# Patient Record
Sex: FEMALE | Race: WHITE | NOT HISPANIC OR LATINO
[De-identification: names, ages, dates, MRNs, and addresses within clinical notes are randomized per-mention and may not be internally consistent; named-entity substitution may affect disease eponyms.]

---

## 2018-03-30 ENCOUNTER — RESULT REVIEW (OUTPATIENT)
Age: 43
End: 2018-03-30

## 2019-04-11 ENCOUNTER — RESULT REVIEW (OUTPATIENT)
Age: 44
End: 2019-04-11

## 2020-07-20 ENCOUNTER — RESULT REVIEW (OUTPATIENT)
Age: 45
End: 2020-07-20

## 2020-07-21 PROBLEM — Z00.00 ENCOUNTER FOR PREVENTIVE HEALTH EXAMINATION: Status: ACTIVE | Noted: 2020-07-21

## 2020-07-22 ENCOUNTER — APPOINTMENT (OUTPATIENT)
Dept: BREAST CENTER | Facility: CLINIC | Age: 45
End: 2020-07-22
Payer: COMMERCIAL

## 2020-07-22 VITALS
HEIGHT: 64 IN | DIASTOLIC BLOOD PRESSURE: 74 MMHG | WEIGHT: 140 LBS | HEART RATE: 96 BPM | SYSTOLIC BLOOD PRESSURE: 101 MMHG | BODY MASS INDEX: 23.9 KG/M2

## 2020-07-22 DIAGNOSIS — Z78.9 OTHER SPECIFIED HEALTH STATUS: ICD-10-CM

## 2020-07-22 DIAGNOSIS — D68.0 VON WILLEBRAND'S DISEASE: ICD-10-CM

## 2020-07-22 DIAGNOSIS — Z86.39 PERSONAL HISTORY OF OTHER ENDOCRINE, NUTRITIONAL AND METABOLIC DISEASE: ICD-10-CM

## 2020-07-22 DIAGNOSIS — N64.4 MASTODYNIA: ICD-10-CM

## 2020-07-22 DIAGNOSIS — G61.81 CHRONIC INFLAMMATORY DEMYELINATING POLYNEURITIS: ICD-10-CM

## 2020-07-22 PROCEDURE — 99204 OFFICE O/P NEW MOD 45 MIN: CPT

## 2020-07-22 RX ORDER — GLUC/MSM/COLGN2/HYAL/ANTIARTH3 375-375-20
TABLET ORAL
Refills: 0 | Status: ACTIVE | COMMUNITY

## 2020-07-22 RX ORDER — IVABRADINE 7.5 MG/1
TABLET, FILM COATED ORAL
Refills: 0 | Status: ACTIVE | COMMUNITY

## 2020-07-22 RX ORDER — MULTIVITAMIN
TABLET ORAL
Refills: 0 | Status: ACTIVE | COMMUNITY

## 2020-07-22 NOTE — REVIEW OF SYSTEMS
[Heart Rate Is Fast] : fast heart rate [Palpitations] : palpitations [Abdominal Pain] : abdominal pain [Constipation] : constipation [Upper Back Pain] : upper back pain [Breast Pain] : breast pain [Limb Weakness] : limb weakness [Muscle Weakness] : muscle weakness [Easy Bruising] : a tendency for easy bruising [Negative] : Psychiatric

## 2020-07-23 NOTE — PHYSICAL EXAM
[Normocephalic] : normocephalic [No Supraclavicular Adenopathy] : no supraclavicular adenopathy [Atraumatic] : atraumatic [Supple] : supple [Symmetrical] : symmetrical [Examined in the supine and seated position] : examined in the supine and seated position [No Nipple Retraction] : no left nipple retraction [No dominant masses] : no dominant masses left breast [No Nipple Discharge] : no left nipple discharge [No Axillary Lymphadenopathy] : no left axillary lymphadenopathy [No Rashes] : no rashes [No Edema] : no edema [No Ulceration] : no ulceration [de-identified] : in area of patient's pain there is an area of nodularity

## 2020-07-23 NOTE — CONSULT LETTER
[Dear  ___] : Dear  [unfilled], [( Thank you for referring [unfilled] for consultation for _____ )] : Thank you for referring [unfilled] for consultation for [unfilled] [Sincerely,] : Sincerely, [Please see my note below.] : Please see my note below. [Consult Closing:] : Thank you very much for allowing me to participate in the care of this patient.  If you have any questions, please do not hesitate to contact me. [FreeTextEntry3] : Vicki Chong MS DO\par Breast Surgeon\par [facility] \[place] [address]\par  [DrTala  ___] : Dr. ROMANO

## 2020-07-23 NOTE — HISTORY OF PRESENT ILLNESS
[FreeTextEntry1] : This is a 44 years old being referred by Dr. Rodrigues for right breast pain. She had recent breast exam and was very tender on right. She noticed in her areola, 6 months ago greyish white fluid comes out with pressure only. Denies any trauma, changes to herbs, diet or supplements. She states it is not from her nipple but from the bumps around the areola.\par \par She does SBE. \par She has noticed a change in her right  breast or a right  breast lump.\par She has not noticed a change in her nipple or nipple area.\par She has not noticed a change in the skin of the breast.\par She is experiencing greyish white right discharge from sanford glands.\par She is experiencing right breast pain.\par She has noticed a lump or lymph node under the right armpit. \par \par BREAST CANCER RISK FACTORS\par Menarche: 14\par Date of LMP: 7/1/2020\par Menopause: pre \par Grav: 2     Para: 1\par Age at first live birth: 34\par Nursed: yes \par Hysterectomy: no\par Oophorectomy: no\par OCP: yes in the past for about 15 years. \par HRT: no\par Last pap/pelvic exam: 7/22/2020 results pending  \par Related family history: paternal grandmother BCA age 40\par Ashkenazi: yes\par Mastery risk assessment:  BRCAPRO 18.3%, TCv7 24.8%, TCv8 23.4%, Ann Marie 12%, Yusuf 9.1%\par BRCA testing: no\par Bra size: 34A\par \par Last mammogram: 6/12/2020               [facility] \par Report reviewed.                               Images reviewed on PACS\par Results: BIRADS 3\par stable examination with no findings.\par \par Last ultrasound: 6/12/2020                                  Location: [facility] \par Report reviewed.                                 Images reviewed. \par Results: BIRADS 3\par right breast: development of a probable focally dilated duct at 7:00. 6 month targeted ultrasound recommended.\par  Left breast: stable examination with no findings\par \par Last MRI:  never                                           Location:\par Report reviewed.\par

## 2020-07-23 NOTE — ASSESSMENT
[FreeTextEntry1] : 45 yo female with right breast pain\par reviewed her risk status, she is high risk on TCv7 and v8\par reviewed high risk screening\par recommend breast MRI, reviewed risk of false positives\par She will get MRI with her next cycle\par I will call her after breast MRI to formulate appropriate plan\par rec right breast 7:00 follow up sonogram DEC 2020\par if MRI negative plan cbe in 6-8 weeks to ensure stability of clinical breast exam\par She knows to call or return sooner should any concerns or questions arise.\par

## 2020-07-29 ENCOUNTER — TRANSCRIPTION ENCOUNTER (OUTPATIENT)
Age: 45
End: 2020-07-29

## 2020-09-08 ENCOUNTER — TRANSCRIPTION ENCOUNTER (OUTPATIENT)
Age: 45
End: 2020-09-08

## 2020-09-10 ENCOUNTER — TRANSCRIPTION ENCOUNTER (OUTPATIENT)
Age: 45
End: 2020-09-10

## 2020-09-21 ENCOUNTER — APPOINTMENT (OUTPATIENT)
Dept: HEMATOLOGY ONCOLOGY | Facility: CLINIC | Age: 45
End: 2020-09-21
Payer: COMMERCIAL

## 2020-09-21 ENCOUNTER — LABORATORY RESULT (OUTPATIENT)
Age: 45
End: 2020-09-21

## 2020-09-21 VITALS
OXYGEN SATURATION: 100 % | HEART RATE: 96 BPM | TEMPERATURE: 98.3 F | HEIGHT: 64 IN | SYSTOLIC BLOOD PRESSURE: 120 MMHG | WEIGHT: 141 LBS | BODY MASS INDEX: 24.07 KG/M2 | RESPIRATION RATE: 15 BRPM | DIASTOLIC BLOOD PRESSURE: 81 MMHG

## 2020-09-21 PROCEDURE — 99205 OFFICE O/P NEW HI 60 MIN: CPT

## 2020-09-21 NOTE — HISTORY OF PRESENT ILLNESS
[de-identified] : \par This is a 45 y/o woman with a hx of mild type 1 VW disease and now needs breast biopsy/ \par Patient noticed easy bruising when dx in lyme disease in 2012 , working in [place] , saw hematology there dr maradiaga. He ran VW panel and found the following : VW antigen was low at 51, RCofactor was 47(low) , VW activity 49(low) , multimers were normal and Factor 8 was low. \par She was also found to have type 0 blood so it was not clear if this was mild VW disease or possibly  from the blood type. \par In 2013, we have labs that show a very good response to ddAVP after about 30 mins with normalization of all levels. \par In the past she had had c sections, gallbladder and wisdom teeth. all ok no bleeding (did not receive any treatements) \par THen she had a sphintertromy 5 years ago and was given  ddAVP for preventing bleeding . She did not have any bleeding , Side effects included  face turns bright red , flusched hot feeling, no hx of sodium  dropping\par Had colonoscopy wit ddAVP  also. Tolerated well with no bleeding \par \par never nose bleeding but does bruise easier \par hx of heavy periods better now more regular \par She also has a history of hasinotos and follows with dr chung, \par Has a family hx of multiplecancers but did not do genetic testing yet \par She recently had a mammogram and ultrasound. THe ultrasound was read as focal ductal dilation at 7oclock.  recommend 6 month follow up but she had an MRI shich now showsareas of enhancement of the 6-7 oclock areas in right breast. \par She is scheduled to have biopsy via MRI on sept 30,2020. \par \par Patient also has a hx of leukopenia , this was thought to be due to elavil.  Never had bone marrow bx. 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

## 2020-09-21 NOTE — ASSESSMENT
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 \par \par 1) VW disease \par reviewed labs from 2012 and 2013, shows excelletn response to ddAVP\par she has tolerated ddAVP prior to 2 other procedures and tolerated well \par she has not had any serious bleeding episodes \par patient is going for breast bx \par although her levels are likley sufficient for biopsy it is safer to give ddAVP cheryl given that she has tolerated in past to prevent hematoma that can be painful and can interfere with surgery \par recommend ddAVP 0.3 mcg /kg prior to procedure (30 mins prior ) \par will come to infusion at 1030 am for a 1215 bx \par reviewed side effects including - flushing hyperthermia,  fluid retention and hyponatremia  and other serious reactions \par will check baseline coags today \par will also check na level today and one day after the ddAVP \par \par 2) breast abnormality on mri \par await bx \par discussed that even if bx is negative she is considered high risk given family hx and biopsy \par mri and mammogram alternatign \par consider risk reducing serm \par genetics pending \par \par 3) leukopenia \par get old labs from prior onc \par check cbc \par \par 4) mgus \par repeat spep ig levels etc \par \par \par follow up day after ddavp for cmp \par follow up in 2 weeks post surgery to discuss pathology and risk reduction  medications \par dw patient at length \par

## 2020-09-21 NOTE — PHYSICAL EXAM
[Restricted in physically strenuous activity but ambulatory and able to carry out work of a light or sedentary nature] : Status 1- Restricted in physically strenuous activity but ambulatory and able to carry out work of a light or sedentary nature, e.g., light house work, office work [Normal] : affect appropriate [de-identified] : prominent thyroid

## 2020-09-22 LAB
ALBUMIN MFR SERPL ELPH: 57 %
ALBUMIN SERPL ELPH-MCNC: 4.9 G/DL
ALBUMIN SERPL-MCNC: 4.7 G/DL
ALBUMIN/GLOB SERPL: 1.3 RATIO
ALP BLD-CCNC: 80 U/L
ALPHA1 GLOB MFR SERPL ELPH: 3.3 %
ALPHA1 GLOB SERPL ELPH-MCNC: 0.3 G/DL
ALPHA2 GLOB MFR SERPL ELPH: 8.6 %
ALPHA2 GLOB SERPL ELPH-MCNC: 0.7 G/DL
ALT SERPL-CCNC: 10 U/L
ANION GAP SERPL CALC-SCNC: 14 MMOL/L
APTT BLD: 33.6 SEC
APTT IMM NP/PRE NP PPP: NORMAL SEC
APTT INV RATIO PPP: 33.6 SEC
AST SERPL-CCNC: 17 U/L
B-GLOBULIN MFR SERPL ELPH: 9.2 %
B-GLOBULIN SERPL ELPH-MCNC: 0.8 G/DL
BASOPHILS # BLD AUTO: 0.03 K/UL
BASOPHILS NFR BLD AUTO: 0.6 %
BILIRUB SERPL-MCNC: 0.7 MG/DL
BUN SERPL-MCNC: 14 MG/DL
CALCIUM SERPL-MCNC: 9.8 MG/DL
CHLORIDE SERPL-SCNC: 100 MMOL/L
CO2 SERPL-SCNC: 25 MMOL/L
CREAT SERPL-MCNC: 0.7 MG/DL
DEPRECATED KAPPA LC FREE/LAMBDA SER: 1.55 RATIO
DEPRECATED KAPPA LC FREE/LAMBDA SER: 1.55 RATIO
EOSINOPHIL # BLD AUTO: 0.08 K/UL
EOSINOPHIL NFR BLD AUTO: 1.5 %
FACT IX ACT/NOR PPP: 103 %
FACT VIII ACT/NOR PPP: 115 %
FERRITIN SERPL-MCNC: 22 NG/ML
FOLATE SERPL-MCNC: 7.8 NG/ML
GAMMA GLOB FLD ELPH-MCNC: 1.8 G/DL
GAMMA GLOB MFR SERPL ELPH: 21.9 %
GLUCOSE SERPL-MCNC: 67 MG/DL
HCT VFR BLD CALC: 43.6 %
HGB BLD-MCNC: 13.8 G/DL
IGA SER QL IEP: 82 MG/DL
IGG SER QL IEP: 1697 MG/DL
IGM SER QL IEP: 267 MG/DL
IMM GRANULOCYTES NFR BLD AUTO: 0.2 %
INTERPRETATION SERPL IEP-IMP: NORMAL
IRON SATN MFR SERPL: 16 %
IRON SERPL-MCNC: 62 UG/DL
KAPPA LC CSF-MCNC: 0.89 MG/DL
KAPPA LC CSF-MCNC: 0.89 MG/DL
KAPPA LC SERPL-MCNC: 1.38 MG/DL
KAPPA LC SERPL-MCNC: 1.38 MG/DL
LDH SERPL-CCNC: 187 U/L
LYMPHOCYTES # BLD AUTO: 1.39 K/UL
LYMPHOCYTES NFR BLD AUTO: 26.7 %
M PROTEIN SPEC IFE-MCNC: NORMAL
MAN DIFF?: NORMAL
MCHC RBC-ENTMCNC: 29.1 PG
MCHC RBC-ENTMCNC: 31.7 GM/DL
MCV RBC AUTO: 92 FL
MONOCYTES # BLD AUTO: 0.33 K/UL
MONOCYTES NFR BLD AUTO: 6.3 %
NEUTROPHILS # BLD AUTO: 3.36 K/UL
NEUTROPHILS NFR BLD AUTO: 64.7 %
NPP NORMAL POOLED PLASMA: NORMAL SECS
PLATELET # BLD AUTO: 218 K/UL
POTASSIUM SERPL-SCNC: 3.9 MMOL/L
PROT SERPL-MCNC: 8.2 G/DL
RBC # BLD: 4.74 M/UL
RBC # FLD: 13.4 %
SODIUM SERPL-SCNC: 139 MMOL/L
TIBC SERPL-MCNC: 375 UG/DL
UIBC SERPL-MCNC: 313 UG/DL
VIT B12 SERPL-MCNC: 768 PG/ML
VWF AG PPP IA-ACNC: 124 %
VWF:RCO ACT/NOR PPP PL AGG: 108 %
WBC # FLD AUTO: 5.2 K/UL

## 2020-09-25 LAB — IGA 24H UR QL IFE: NORMAL

## 2020-10-01 ENCOUNTER — APPOINTMENT (OUTPATIENT)
Dept: HEMATOLOGY ONCOLOGY | Facility: CLINIC | Age: 45
End: 2020-10-01
Payer: COMMERCIAL

## 2020-10-01 VITALS
HEIGHT: 64 IN | BODY MASS INDEX: 24.92 KG/M2 | TEMPERATURE: 97.7 F | OXYGEN SATURATION: 98 % | DIASTOLIC BLOOD PRESSURE: 86 MMHG | HEART RATE: 93 BPM | WEIGHT: 146 LBS | SYSTOLIC BLOOD PRESSURE: 127 MMHG | RESPIRATION RATE: 18 BRPM

## 2020-10-01 PROCEDURE — 99214 OFFICE O/P EST MOD 30 MIN: CPT

## 2020-10-01 NOTE — HISTORY OF PRESENT ILLNESS
[de-identified] : his is a 45 y/o woman with a hx of mild type 1 VW disease and now needs breast biopsy/ \par Patient noticed easy bruising when dx in lyme disease in 2012 , working in [place] , saw hematology there dr maradiaga. He ran VW panel and found the following : VW antigen was low at 51, RCofactor was 47(low) , VW activity 49(low) , multimers were normal and Factor 8 was low. \par She was also found to have type 0 blood so it was not clear if this was mild VW disease or possibly from the blood type. \par In 2013, we have labs that show a very good response to ddAVP after about 30 mins with normalization of all levels. \par In the past she had had c sections, gallbladder and wisdom teeth. all ok no bleeding (did not receive any treatements) \par THen she had a sphintertromy 5 years ago and was given ddAVP for preventing bleeding. She did not have any bleeding , Side effects included face turns bright red , flusched hot feeling, no hx of sodium dropping\par Had colonoscopy wit ddAVP also. Tolerated well with no bleeding \par \par never nose bleeding but does bruise easier \par hx of heavy periods better now more regular \par She also has a history of hasinotos and follows with dr chung, \par Has a family hx of multiplecancers but did not do genetic testing yet \par She recently had a mammogram and ultrasound. THe ultrasound was read as focal ductal dilation at 7oclock. recommend 6 month follow up but she had an MRI shich now showsareas of enhancement of the 6-7 oclock areas in right breast. \par She is scheduled to have biopsy via MRI on sept 30,2020. \par \par Patient also has a hx of leukopenia , this was thought to be due to elavil. Never had bone marrow bx. 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 \par \par \par \par  [de-identified] : Feeling very poorly , got ddavp and bx yesterdy today is very nasueated having diarrhea and trouble urinating + headache \par no bleeding , bx went well \par

## 2020-10-01 NOTE — ASSESSMENT
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 \par \par \par 1) dehydration \par due to diarrhea and poor po intake since ddAVP \par proceed with hydration  - 1L NS \par call if symptoms fail to improve \par \par 2) hyponatremia \par due to ddAVP \par hydrate 1 L \par no evidence of seizures\par encourage to eat salt contianing foods \par should improve  today given dosing of ddAVP was over 24 hours ago \par repeat na level is stable \par will repeat  tomorrow \par \par 3) VW disease \par reviewed labs from 2012 and 2013, shows excelletn response to ddAVP\par she has tolerated ddAVP prior to 2 other procedures and tolerated well \par now with severe reaction and hyponatremia \par will hold before minor procedures in future \par no evidence of bleeding \par \par 4) breast abnormality on mri \par await bx \par discussed that even if bx is negative she is considered high risk given family hx and biopsy \par mri and mammogram alternatign \par consider risk reducing  SERM will dw patient at next visit \par genetics pending \par \par 3) leukopenia \par get old labs from prior onc \par check cbc \par \par 4) mgus \par follow up spep \par \par \par follow up tomorrow for na level \par follow up in 2 weeks to discuss pathology and chemoprevention \par dw patient at length

## 2020-10-01 NOTE — REVIEW OF SYSTEMS
[Fatigue] : fatigue [Diarrhea] : diarrhea [Negative] : Allergic/Immunologic [FreeTextEntry7] : n/v, diarreha

## 2020-10-01 NOTE — PHYSICAL EXAM
[Ambulatory and capable of all self care but unable to carry out any work activities] : Status 2- Ambulatory and capable of all self care but unable to carry out any work activities. Up and about more than 50% of waking hours [Normal] : affect appropriate [de-identified] : prominent thyroid , dry mm

## 2020-10-01 NOTE — REASON FOR VISIT
[Follow-Up Visit] : a follow-up visit for [FreeTextEntry2] : complains of fatigue diarrhea and nausea since ddavp yesterday

## 2020-10-02 ENCOUNTER — APPOINTMENT (OUTPATIENT)
Dept: HEMATOLOGY ONCOLOGY | Facility: CLINIC | Age: 45
End: 2020-10-02
Payer: COMMERCIAL

## 2020-10-02 VITALS
WEIGHT: 140 LBS | DIASTOLIC BLOOD PRESSURE: 77 MMHG | SYSTOLIC BLOOD PRESSURE: 112 MMHG | TEMPERATURE: 99.1 F | OXYGEN SATURATION: 99 % | RESPIRATION RATE: 19 BRPM | HEART RATE: 107 BPM | BODY MASS INDEX: 23.9 KG/M2 | HEIGHT: 64 IN

## 2020-10-02 PROCEDURE — 99499A: CUSTOM | Mod: NC

## 2020-10-06 LAB — VWF MULTIMERS PPP IA-ACNC: NORMAL

## 2020-10-12 ENCOUNTER — OUTPATIENT (OUTPATIENT)
Dept: OUTPATIENT SERVICES | Facility: HOSPITAL | Age: 45
LOS: 1 days | Discharge: HOME | End: 2020-10-12

## 2020-10-12 DIAGNOSIS — C44.40 UNSPECIFIED MALIGNANT NEOPLASM OF SKIN OF SCALP AND NECK: ICD-10-CM

## 2020-10-13 DIAGNOSIS — Z31.5 ENCOUNTER FOR PROCREATIVE GENETIC COUNSELING: ICD-10-CM

## 2020-10-14 ENCOUNTER — APPOINTMENT (OUTPATIENT)
Dept: HEMATOLOGY ONCOLOGY | Facility: CLINIC | Age: 45
End: 2020-10-14
Payer: COMMERCIAL

## 2020-10-14 PROCEDURE — 99499A: CUSTOM | Mod: NC

## 2020-10-16 ENCOUNTER — APPOINTMENT (OUTPATIENT)
Dept: HEMATOLOGY ONCOLOGY | Facility: CLINIC | Age: 45
End: 2020-10-16
Payer: COMMERCIAL

## 2020-10-16 VITALS
TEMPERATURE: 97.8 F | HEIGHT: 64 IN | BODY MASS INDEX: 24.45 KG/M2 | WEIGHT: 143.19 LBS | DIASTOLIC BLOOD PRESSURE: 82 MMHG | HEART RATE: 92 BPM | RESPIRATION RATE: 20 BRPM | SYSTOLIC BLOOD PRESSURE: 119 MMHG | OXYGEN SATURATION: 98 %

## 2020-10-16 PROCEDURE — 99214 OFFICE O/P EST MOD 30 MIN: CPT

## 2020-10-16 NOTE — HISTORY OF PRESENT ILLNESS
[de-identified] : had bad reaction to ddavp - diarrhea vomiting nausea confused  fatigue  and severe hyponatremia , improved with hydration \par no bleeding bx went well \par meeting with dr dewey next week \par pathology shows - right breast negative  focal ductal hyperplasia usual hyperplasia \par left breast - sclerosing intraductal papilloma \par \par \par  [de-identified] : his is a 45 y/o woman with a hx of mild type 1 VW disease and now needs breast biopsy/ \par Patient noticed easy bruising when dx in lyme disease in 2012 , working in [place] , saw hematology there dr maradiaga. He ran VW panel and found the following : VW antigen was low at 51, RCofactor was 47(low) , VW activity 49(low) , multimers were normal and Factor 8 was low. \par She was also found to have type 0 blood so it was not clear if this was mild VW disease or possibly from the blood type. \par In 2013, we have labs that show a very good response to ddAVP after about 30 mins with normalization of all levels. \par In the past she had had c sections, gallbladder and wisdom teeth. all ok no bleeding (did not receive any treatements) \par THen she had a sphintertromy 5 years ago and was given ddAVP for preventing bleeding. She did not have any bleeding , Side effects included face turns bright red , flusched hot feeling, no hx of sodium dropping\par Had colonoscopy wit ddAVP also. Tolerated well with no bleeding \par \par never nose bleeding but does bruise easier \par hx of heavy periods better now more regular \par She also has a history of hasinotos and follows with dr chung, \par Has a family hx of multiplecancers but did not do genetic testing yet \par She recently had a mammogram and ultrasound. THe ultrasound was read as focal ductal dilation at 7oclock. recommend 6 month follow up but she had an MRI shich now showsareas of enhancement of the 6-7 oclock areas in right breast. \par She is scheduled to have biopsy via MRI on sept 30,2020. \par \par Patient also has a hx of leukopenia , this was thought to be due to elavil. Never had bone marrow bx. 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 \par \par \par \par

## 2020-10-16 NOTE — ASSESSMENT
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 \par \par 1) VW disease \par reviewed labs from 2012 and 2013, shows excelletn response to ddAVP\par she has tolerated ddAVP prior to 2 other procedures and tolerated well \par now with severe reaction and hyponatremia  to ddavp given prior to breast bx \par now has to go for surgery for breast papilloma \par discussed the option of holding further ddavp and giving only if there is sig bleeding after procedure given excelelnt levels at last check \par patient is very nervous about bleeding so does not want to take chance \par repeat levels today (vw, activity etc) \par will then given lower dose ddavp and support with salt tabs (tolvaptan is also an option but very expensive and unlikely to be covered by insurance) , and other intervetions to prevent hyponatremia (see below) \par \par 2) hyponatremia \par due to ddAVP \par for next procedure will give lower dose ddAVP in preop area -  0.2mcg /kg , 30 mins prior \par salt tabs, and give zofran for nausea and imodium for diarrhea \par decrease amount of water (was drinking a lot after procedure) \par follow up day after \par \par 3) breast abnormality on mri \par biopsy results discussed with patient \par will be going for excision of papilloma dw patient at length \par mri and mammogram alternating \par consider risk reducing  SERM will dw patient at next visit \par genetics pending \par \par 4) leukopenia \par stable last cbc \par repeat now \par \par 5 ) mgus \par spep negative \par high ig levels due to IVIG \par \par \par follow up in 2 weeks post surgery \par dw patient at length

## 2020-10-16 NOTE — PHYSICAL EXAM
[Fully active, able to carry on all pre-disease performance without restriction] : Status 0 - Fully active, able to carry on all pre-disease performance without restriction [Normal] : pharynx is unremarkable, moist mucus membrane, no oral lesions [de-identified] : prom thyroid

## 2020-10-19 LAB
ALBUMIN SERPL ELPH-MCNC: 4.7 G/DL
ALP BLD-CCNC: 74 U/L
ALT SERPL-CCNC: 13 U/L
ANION GAP SERPL CALC-SCNC: 12 MMOL/L
AST SERPL-CCNC: 18 U/L
BASOPHILS # BLD AUTO: 0.04 K/UL
BASOPHILS NFR BLD AUTO: 0.9 %
BILIRUB SERPL-MCNC: 0.6 MG/DL
BUN SERPL-MCNC: 11 MG/DL
CALCIUM SERPL-MCNC: 9.3 MG/DL
CHLORIDE SERPL-SCNC: 103 MMOL/L
CO2 SERPL-SCNC: 24 MMOL/L
CREAT SERPL-MCNC: 0.61 MG/DL
EOSINOPHIL # BLD AUTO: 0.11 K/UL
EOSINOPHIL NFR BLD AUTO: 2.5 %
FACT VIII ACT/NOR PPP: 116 %
FERRITIN SERPL-MCNC: 29 NG/ML
FOLATE SERPL-MCNC: 17.8 NG/ML
GLUCOSE SERPL-MCNC: 59 MG/DL
HCT VFR BLD CALC: 42.1 %
HGB BLD-MCNC: 13.3 G/DL
IMM GRANULOCYTES NFR BLD AUTO: 0.9 %
LYMPHOCYTES # BLD AUTO: 1.1 K/UL
LYMPHOCYTES NFR BLD AUTO: 25.1 %
MAN DIFF?: NORMAL
MCHC RBC-ENTMCNC: 28.7 PG
MCHC RBC-ENTMCNC: 31.6 GM/DL
MCV RBC AUTO: 90.7 FL
MONOCYTES # BLD AUTO: 0.37 K/UL
MONOCYTES NFR BLD AUTO: 8.4 %
NEUTROPHILS # BLD AUTO: 2.72 K/UL
NEUTROPHILS NFR BLD AUTO: 62.2 %
PLATELET # BLD AUTO: 222 K/UL
POTASSIUM SERPL-SCNC: 4.5 MMOL/L
PROT SERPL-MCNC: 7.8 G/DL
RBC # BLD: 4.64 M/UL
RBC # FLD: 13.6 %
SODIUM SERPL-SCNC: 139 MMOL/L
TSH SERPL-ACNC: 3.5 UIU/ML
VIT B12 SERPL-MCNC: 985 PG/ML
VWF AG PPP IA-ACNC: 143 %
VWF:RCO ACT/NOR PPP PL AGG: 104 %
WBC # FLD AUTO: 4.38 K/UL

## 2020-10-21 ENCOUNTER — APPOINTMENT (OUTPATIENT)
Dept: BREAST CENTER | Facility: CLINIC | Age: 45
End: 2020-10-21
Payer: COMMERCIAL

## 2020-10-21 VITALS
DIASTOLIC BLOOD PRESSURE: 79 MMHG | HEART RATE: 102 BPM | HEIGHT: 64 IN | BODY MASS INDEX: 24.41 KG/M2 | WEIGHT: 143 LBS | SYSTOLIC BLOOD PRESSURE: 113 MMHG

## 2020-10-21 PROCEDURE — 99215 OFFICE O/P EST HI 40 MIN: CPT

## 2020-10-21 PROCEDURE — 99072 ADDL SUPL MATRL&STAF TM PHE: CPT

## 2020-10-21 NOTE — REASON FOR VISIT
[Follow-Up Visit] : a follow-up visit for [Follow-Up: _____] : a [unfilled] follow-up visit [FreeTextEntry1] : right breast pain

## 2020-10-21 NOTE — ASSESSMENT
[FreeTextEntry1] : 46 yo female with right breast pain-improved and left breast IDP\par I explained that it does not increase her risk of breast cancer but that surgical excision is recommended to rule out any associated underlying malignant processes. We reviewed the option of observation alone. We also reviewed the procedure of localization (magseed) and excision. We reviewed postop care and recovery. We reviewed the risks of infection, bleeding, hematoma, seroma, deformity, scarring and change of sensation particularly in the nipple with possible loss of sensation. I outlined the procedure and what she should expect on the day of surgery. She understands all of the above and her questions have been answered. She will see Dr. Claire  for medical clearance. Reviewed COVID preop testing.\par \par rec right breast 7:00 follow up sonogram DEC 2020\par \par She knows to call or return sooner should any concerns or questions arise.\par

## 2020-10-21 NOTE — PHYSICAL EXAM
[Normal] : affect appropriate [Normocephalic] : normocephalic [Atraumatic] : atraumatic [Supple] : supple [No Supraclavicular Adenopathy] : no supraclavicular adenopathy [Examined in the supine and seated position] : examined in the supine and seated position [Symmetrical] : symmetrical [No dominant masses] : no dominant masses left breast [No Nipple Retraction] : no left nipple retraction [No Nipple Discharge] : no left nipple discharge [No Axillary Lymphadenopathy] : no left axillary lymphadenopathy [No Edema] : no edema [No Rashes] : no rashes [No Ulceration] : no ulceration [de-identified] : healing bx site [de-identified] : healing bx site, no masses

## 2020-10-21 NOTE — HISTORY OF PRESENT ILLNESS
[FreeTextEntry1] : This is a 45 years old being referred by Dr. Rodrigues for right breast pain. She had recent breast exam and was very tender on right. She noticed in her areola, 6 months ago greyish white fluid comes out with pressure only. Denies any trauma, changes to herbs, diet or supplements. She states it is not from her nipple but from the bumps around the areola.\par Her sodium level dropped after DDavp is following with Dr. Brothers. Will have ddAVP prior to excision.\par She is s/p right 9/30/2020 (stoplight) MR bx path showed focal ductal hyperplasia and CCC and left breast 4:00 portion of IDP (stoplight) clip.\par \par She does SBE. \par She has noticed a change in her right  breast or a right  breast lump.\par She has not noticed a change in her nipple or nipple area.\par She has not noticed a change in the skin of the breast.\par She is experiencing greyish white right discharge from sanford glands.\par She is experiencing right breast pain.\par She has noticed a lump or lymph node under the right armpit. \par \par BREAST CANCER RISK FACTORS\par Menarche: 14\par Date of LMP: 10/16/2020\par Menopause: pre \par Grav: 2     Para: 1\par Age at first live birth: 34\par Nursed: yes \par Hysterectomy: no\par Oophorectomy: no\par OCP: yes in the past for about 15 years. \par HRT: no\par Last pap/pelvic exam: 7/22/2020 WNL\par Related family history: paternal grandmother BCA age 40\par Ashkenazi: yes\par Mastery risk assessment:  BRCAPRO 18.3%, TCv7 24.8%, TCv8 23.4%, Ann Marie 12%, Yusuf 9.1%\par BRCA testing: no\par Bra size: 34A\par \par Last mammogram: 6/12/2020               Location: [facility] \par Report reviewed.                               Images reviewed on PACS\par Results: BIRADS 3\par stable examination with no findings.\par \par Last ultrasound: 6/12/2020                                  Location: [facility] \par Report reviewed.                                 Images reviewed. \par Results: BIRADS 3\par right breast: development of a probable focally dilated duct at 7:00. 6 month targeted ultrasound recommended.\par  Left breast: stable examination with no findings\par \par Last MRI:  9/3/2020                                           Location: [facility]\par Report reviewed.\par Results: BIRADS 4\par right breast: Abnormal area of enhancement at 6:00-7:00 for which an MR guided biopsy is recommended. Additional focus of benign-appearing nodular enhancement at 3:00.\par left breast: focus of benign appearing nodular enhancement. MR biopsy rec.

## 2020-10-21 NOTE — CONSULT LETTER
[Dear  ___] : Dear  [unfilled], [( Thank you for referring [unfilled] for consultation for _____ )] : Thank you for referring [unfilled] for consultation for [unfilled] [Please see my note below.] : Please see my note below. [Consult Closing:] : Thank you very much for allowing me to participate in the care of this patient.  If you have any questions, please do not hesitate to contact me. [Sincerely,] : Sincerely, [DrTala  ___] : Dr. ROMANO [FreeTextEntry1] : She will be having left excisional breast biopsy for intraductal papilloma. [FreeTextEntry3] : Vicki Chong MS DO\par Breast Surgeon\par [facility] \[place] [address]\par

## 2020-10-22 ENCOUNTER — NON-APPOINTMENT (OUTPATIENT)
Age: 45
End: 2020-10-22

## 2020-10-23 LAB
CORTICOSTEROID BIND GLOBULIN: 2.6 MG/DL
CORTIS SERPL-MCNC: 9.5 UG/DL
CORTISOL, FREE: 0.38 UG/DL
PFCX: 4 %

## 2020-11-09 LAB — VWF MULTIMERS PPP IA-ACNC: NORMAL

## 2020-11-24 ENCOUNTER — NON-APPOINTMENT (OUTPATIENT)
Age: 45
End: 2020-11-24

## 2020-12-09 ENCOUNTER — NON-APPOINTMENT (OUTPATIENT)
Age: 45
End: 2020-12-09

## 2020-12-10 ENCOUNTER — APPOINTMENT (OUTPATIENT)
Dept: BREAST CENTER | Facility: HOSPITAL | Age: 45
End: 2020-12-10
Payer: COMMERCIAL

## 2020-12-10 PROCEDURE — 14001 TIS TRNFR TRUNK 10.1-30SQCM: CPT

## 2020-12-10 PROCEDURE — 19125 EXCISION BREAST LESION: CPT | Mod: LT,59

## 2020-12-10 PROCEDURE — 76098 X-RAY EXAM SURGICAL SPECIMEN: CPT | Mod: 26

## 2020-12-11 ENCOUNTER — APPOINTMENT (OUTPATIENT)
Dept: HEMATOLOGY ONCOLOGY | Facility: CLINIC | Age: 45
End: 2020-12-11
Payer: COMMERCIAL

## 2020-12-11 VITALS
TEMPERATURE: 97.9 F | RESPIRATION RATE: 18 BRPM | OXYGEN SATURATION: 99 % | HEART RATE: 90 BPM | BODY MASS INDEX: 24.59 KG/M2 | WEIGHT: 144 LBS | HEIGHT: 64 IN

## 2020-12-11 VITALS — SYSTOLIC BLOOD PRESSURE: 120 MMHG | DIASTOLIC BLOOD PRESSURE: 84 MMHG

## 2020-12-11 PROCEDURE — 99213 OFFICE O/P EST LOW 20 MIN: CPT

## 2020-12-11 PROCEDURE — 99072 ADDL SUPL MATRL&STAF TM PHE: CPT

## 2020-12-11 NOTE — ASSESSMENT
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 \par \par 1) VW disease \par reviewed labs from 2012 and 2013, shows excelletn response to ddAVP\par she has tolerated ddAVP prior to 2 other procedures and tolerated well \par now with severe reaction and hyponatremia  to ddavp given prior to breast bx \par now has to go for surgery for breast papilloma \par discussed the option of holding further ddavp and giving only if there is sig bleeding after procedure given excelelnt levels at last check \par patient is very nervous about bleeding so does not want to take chance \par repeat levels today (vw, activity etc) \par will then given lower dose ddavp and support with salt tabs (tolvaptan is also an option but very expensive and unlikely to be covered by insurance) , and other interventions to prevent hyponatremia (see below) \par \par 2) hyponatremia - mild & asymptomatic\par Na 12/11/2020: 132\par due to ddAVP \par pt will take additional 1g PO sodium today\par decrease amount of water \par has not needed Zofran or Imodium\par \par \par 3) breast abnormality on MRI \par biopsy results discussed with patient \par will be going for excision of papilloma dw patient at length \par MRI and mammogram alternating \par consider risk reducing  SERM will dw patient \par await pathology from yesterday's breast excision\par genetics pending \par \par 4) leukopenia \par stable last CBC \par repeat now \par \par 5 ) MGUS \par SPEP negative \par high Ig levels due to IVIG \par \par \par follow up in 2 weeks post surgery \par dw patient at length

## 2020-12-11 NOTE — HISTORY OF PRESENT ILLNESS
[de-identified] : 12/11/2020:\par Surgery went well yesterday\par tolerated ddAVP yesterday\par take 1g sodium PO last night\par no fatigue, no h/a, nausea or vomiting\par no bleeding, no bruising\par \par pathology from breast excision yesterday is pending\par previous pathology showed:\par pathology shows - right breast negative  focal ductal hyperplasia usual hyperplasia \par left breast - sclerosing intraductal papilloma \par \par \par  [de-identified] : his is a 43 y/o woman with a hx of mild type 1 VW disease and now needs breast biopsy/ \par Patient noticed easy bruising when dx in lyme disease in 2012 , working in [place] , saw hematology there dr maradiaga. He ran VW panel and found the following : VW antigen was low at 51, RCofactor was 47(low) , VW activity 49(low) , multimers were normal and Factor 8 was low. \par She was also found to have type 0 blood so it was not clear if this was mild VW disease or possibly from the blood type. \par In 2013, we have labs that show a very good response to ddAVP after about 30 mins with normalization of all levels. \par In the past she had had c sections, gallbladder and wisdom teeth. all ok no bleeding (did not receive any treatements) \par THen she had a sphintertromy 5 years ago and was given ddAVP for preventing bleeding. She did not have any bleeding , Side effects included face turns bright red , flusched hot feeling, no hx of sodium dropping\par Had colonoscopy wit ddAVP also. Tolerated well with no bleeding \par \par never nose bleeding but does bruise easier \par hx of heavy periods better now more regular \par She also has a history of hasinotos and follows with dr chung, \par Has a family hx of multiplecancers but did not do genetic testing yet \par She recently had a mammogram and ultrasound. THe ultrasound was read as focal ductal dilation at 7oclock. recommend 6 month follow up but she had an MRI shich now showsareas of enhancement of the 6-7 oclock areas in right breast. \par She is scheduled to have biopsy via MRI on sept 30,2020. \par \par Patient also has a hx of leukopenia , this was thought to be due to elavil. Never had bone marrow bx. 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 \par \par \par \par

## 2020-12-11 NOTE — REASON FOR VISIT
[Follow-Up Visit] : a follow-up visit for [FreeTextEntry2] : here for follow up of VW disease, check Na day after ddAVP

## 2020-12-11 NOTE — PHYSICAL EXAM
[Fully active, able to carry on all pre-disease performance without restriction] : Status 0 - Fully active, able to carry on all pre-disease performance without restriction [Normal] : affect appropriate [de-identified] : prom thyroid

## 2020-12-15 LAB
ALBUMIN SERPL ELPH-MCNC: 4.3 G/DL
ALP BLD-CCNC: 64 U/L
ALT SERPL-CCNC: 10 U/L
ANION GAP SERPL CALC-SCNC: 13 MMOL/L
AST SERPL-CCNC: 17 U/L
BILIRUB SERPL-MCNC: 0.8 MG/DL
BUN SERPL-MCNC: 12 MG/DL
CALCIUM SERPL-MCNC: 8.9 MG/DL
CHLORIDE SERPL-SCNC: 98 MMOL/L
CO2 SERPL-SCNC: 23 MMOL/L
CREAT SERPL-MCNC: 0.78 MG/DL
FERRITIN SERPL-MCNC: 33 NG/ML
GLUCOSE SERPL-MCNC: 62 MG/DL
IRON SATN MFR SERPL: 28 %
IRON SERPL-MCNC: 81 UG/DL
POTASSIUM SERPL-SCNC: 4.4 MMOL/L
PROT SERPL-MCNC: 7 G/DL
SODIUM SERPL-SCNC: 134 MMOL/L
TIBC SERPL-MCNC: 292 UG/DL
UIBC SERPL-MCNC: 211 UG/DL

## 2020-12-17 ENCOUNTER — TRANSCRIPTION ENCOUNTER (OUTPATIENT)
Age: 45
End: 2020-12-17

## 2020-12-23 ENCOUNTER — APPOINTMENT (OUTPATIENT)
Dept: BREAST CENTER | Facility: CLINIC | Age: 45
End: 2020-12-23

## 2021-01-13 ENCOUNTER — APPOINTMENT (OUTPATIENT)
Dept: BREAST CENTER | Facility: CLINIC | Age: 46
End: 2021-01-13
Payer: COMMERCIAL

## 2021-01-13 PROCEDURE — 99024 POSTOP FOLLOW-UP VISIT: CPT

## 2021-01-13 NOTE — ASSESSMENT
[FreeTextEntry1] : doing well\par path reviewed copy given\par rec med/onc cs\par plan mg/sono JUN 2021\par plan DEC 2021\par f/u after JUN imaging\par She knows to call or return sooner should any concerns or questions arise.\par

## 2021-01-13 NOTE — HISTORY OF PRESENT ILLNESS
[FreeTextEntry1] : This is a 45 years old being referred by Dr. Rodrigues for right breast pain. She had recent breast exam and was very tender on right. She noticed in her areola, 6 months ago greyish white fluid comes out with pressure only. Denies any trauma, changes to herbs, diet or supplements. She states it is not from her nipple but from the bumps around the areola.\par Her sodium level dropped after DDavp is following with Dr. Brothers. Will have ddAVP prior to excision.\par She is s/p right 9/30/2020 (stoplight) MR bx path showed focal ductal hyperplasia and CCC and left breast 4:00 portion of IDP (stoplight) clip.\par \par She is s/p left breast 4:00 Ebbx on 12/10/2020 path showed benign breast parenchyma showing UDH, fibroadenomatoid changes, cysts, ccc and stromal fibrosis. She became COVID positive 12/10/2020 and is now ok. Her father is recovering from sepsis and covid.\par \par She does SBE. \par She has noticed a change in her right  breast or a right  breast lump.\par She has not noticed a change in her nipple or nipple area.\par She has not noticed a change in the skin of the breast.\par She is experiencing greyish white right discharge from sanford glands.\par She is experiencing right breast pain.\par She has noticed a lump or lymph node under the right armpit. \par \par BREAST CANCER RISK FACTORS\par Menarche: 14\par Date of LMP: 10/16/2020\par Menopause: pre \par Grav: 2     Para: 1\par Age at first live birth: 34\par Nursed: yes \par Hysterectomy: no\par Oophorectomy: no\par OCP: yes in the past for about 15 years. \par HRT: no\par Last pap/pelvic exam: 7/22/2020 WNL\par Related family history: paternal grandmother BCA age 40\par Ashkenazi: yes\par Mastery risk assessment:  BRCAPRO 18.3%, TCv7 24.8%, TCv8 23.4%, Ann Marie 12%, Yusuf 9.1%\par BRCA testing: no\par Bra size: 34A\par \par Last mammogram: 6/12/2020               Location: [facility] \par Report reviewed.                               Images reviewed on PACS\par Results: BIRADS 3\par stable examination with no findings.\par \par Last ultrasound: 6/12/2020                                  Location: [facility] \par Report reviewed.                                 Images reviewed. \par Results: BIRADS 3\par right breast: development of a probable focally dilated duct at 7:00. 6 month targeted ultrasound recommended.\par  Left breast: stable examination with no findings\par \par Last MRI:  9/3/2020                                           Location: [facility]\par Report reviewed.\par Results: BIRADS 4\par right breast: Abnormal area of enhancement at 6:00-7:00 for which an MR guided biopsy is recommended. Additional focus of benign-appearing nodular enhancement at 3:00.\par left breast: focus of benign appearing nodular enhancement. MR biopsy rec.

## 2021-02-10 ENCOUNTER — APPOINTMENT (OUTPATIENT)
Dept: HEMATOLOGY ONCOLOGY | Facility: CLINIC | Age: 46
End: 2021-02-10
Payer: COMMERCIAL

## 2021-02-10 VITALS
BODY MASS INDEX: 24.72 KG/M2 | TEMPERATURE: 97.9 F | OXYGEN SATURATION: 99 % | WEIGHT: 144 LBS | RESPIRATION RATE: 18 BRPM | HEART RATE: 102 BPM | SYSTOLIC BLOOD PRESSURE: 125 MMHG | DIASTOLIC BLOOD PRESSURE: 77 MMHG

## 2021-02-10 PROCEDURE — 99214 OFFICE O/P EST MOD 30 MIN: CPT

## 2021-02-10 PROCEDURE — 99072 ADDL SUPL MATRL&STAF TM PHE: CPT

## 2021-02-10 NOTE — ASSESSMENT
[FreeTextEntry1] : This is a 45 y/o premenopausal woman who presents for a hx of mild type 1 VW disease (also type 0 blood) , leukopenia, and possible MGUS in setting of many autoimmune diagnosis including hasimotos and also CIDP on IVIG \par \par 1) VW disease \par reviewed labs from 2012 and 2013, shows excelletn response to ddAVP\par she has tolerated ddAVP prior to 2 other procedures and tolerated well \par now with severe reaction and hyponatremia  to ddavp given prior to breast bx \par Patient tolerated the breast surgery well with low-dose DDAVP\par Her von Willebrand levels were within normal limits even prior to the DDAVP now on 2 occasions\par This brings into question the diagnosis of mild type I von Willebrand disease.\par Patient will repeat levels of von Willebrand, activity level, von Willebrand multimers and factor VIII level\par Patient has been off IVIG for some time now it is not clear whether might not this may have some relationship to her levels\par Furthermore the patient does have type O blood.\par Advised the patient that if she requires any further surgical procedures or biopsies that she keep us informed so that we can continue to monitor her levels however given her severe reaction to the DDAVP with hyponatremia if her levels are normal we may hold off on giving DDAVP and just monitor post procedure for any bleeding\par \par 2) hyponatremia -\par Follow-up repeat levels, due to DDAVP\par \par \par \par 3) breast abnormality on MRI \par biopsy results discussed with patient \par Patient had no evidence of atypia or cancer on excisional biopsy\par Ann Marie score was calculated today and is 14%\par The benefits do not seem to outweigh the risks in terms of chemoprevention at this point but will continue to monitor\par \par 4) leukopenia \par Follow-up repeat CBC\par repeat now \par \par 5 ) polyclonal elevation in immunoglobulins\par high Ig levels due to IVIG \par No monoclonal spike on immunoelectrophoresis\par \par Discussed with patient at length \par follow-up in 1 year\par

## 2021-02-10 NOTE — HISTORY OF PRESENT ILLNESS
[de-identified] : \par INTERVAL HX \par 12/18/20- surgery left breast negative for malginancy \par \par 12//20   , had covid recovered well and now has antibodies detected in her peripheral blood for Covid\par \par Neuro wants to hold ivig before and after vaccine \par \par cbc done today with endocrine , [facility] , dr chung \par getting iv ig  today \par mammo and ultrasound - july 2021 , see dr dewey , in 6 months will do mri \par  [de-identified] : his is a 45 y/o woman with a hx of mild type 1 VW disease and now needs breast biopsy/ \par Patient noticed easy bruising when dx in lyme disease in 2012 , working in [place] , saw hematology there dr maradiaga. He ran VW panel and found the following : VW antigen was low at 51, RCofactor was 47(low) , VW activity 49(low) , multimers were normal and Factor 8 was low. \par She was also found to have type 0 blood so it was not clear if this was mild VW disease or possibly from the blood type. \par In 2013, we have labs that show a very good response to ddAVP after about 30 mins with normalization of all levels. \par In the past she had had c sections, gallbladder and wisdom teeth. all ok no bleeding (did not receive any treatements) \par THen she had a sphintertromy 5 years ago and was given ddAVP for preventing bleeding. She did not have any bleeding , Side effects included face turns bright red , flusched hot feeling, no hx of sodium dropping\par Had colonoscopy wit ddAVP also. Tolerated well with no bleeding \par \par never nose bleeding but does bruise easier \par hx of heavy periods better now more regular \par She also has a history of hasinotos and follows with dr chung, \par Has a family hx of multiplecancers but did not do genetic testing yet \par She recently had a mammogram and ultrasound. THe ultrasound was read as focal ductal dilation at 7oclock. recommend 6 month follow up but she had an MRI shich now showsareas of enhancement of the 6-7 oclock areas in right breast. \par She is scheduled to have biopsy via MRI on sept 30,2020. \par \par Patient also has a hx of leukopenia , this was thought to be due to elavil. Never had bone marrow bx. 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 \par \par \par \par

## 2021-02-10 NOTE — REVIEW OF SYSTEMS
[Negative] : Allergic/Immunologic [Fever] : no fever [Fatigue] : no fatigue [Recent Change In Weight] : ~T no recent weight change [Diarrhea] : no diarrhea

## 2021-07-12 ENCOUNTER — NON-APPOINTMENT (OUTPATIENT)
Age: 46
End: 2021-07-12

## 2021-07-12 ENCOUNTER — APPOINTMENT (OUTPATIENT)
Dept: BREAST CENTER | Facility: CLINIC | Age: 46
End: 2021-07-12
Payer: COMMERCIAL

## 2021-07-12 VITALS — DIASTOLIC BLOOD PRESSURE: 78 MMHG | HEART RATE: 103 BPM | HEIGHT: 64 IN | SYSTOLIC BLOOD PRESSURE: 111 MMHG

## 2021-07-12 DIAGNOSIS — Z80.8 FAMILY HISTORY OF MALIGNANT NEOPLASM OF OTHER ORGANS OR SYSTEMS: ICD-10-CM

## 2021-07-12 DIAGNOSIS — Z20.822 CONTACT WITH AND (SUSPECTED) EXPOSURE TO COVID-19: ICD-10-CM

## 2021-07-12 DIAGNOSIS — Z80.42 FAMILY HISTORY OF MALIGNANT NEOPLASM OF PROSTATE: ICD-10-CM

## 2021-07-12 PROCEDURE — 99072 ADDL SUPL MATRL&STAF TM PHE: CPT

## 2021-07-12 PROCEDURE — 99214 OFFICE O/P EST MOD 30 MIN: CPT

## 2021-07-12 NOTE — ASSESSMENT
[FreeTextEntry1] : 46 yo female with high risk\par re-run mastery at 50\par \par plan mg/sono JUL 2022\par plan DEC 2021\par f/u 6 months\par She knows to call or return sooner should any concerns or questions arise.\par

## 2021-07-12 NOTE — HISTORY OF PRESENT ILLNESS
[FreeTextEntry1] : This is a 45 years old being referred by Dr. Rodrigues for right breast pain. She had recent breast exam and was very tender on right. She noticed in her areola, 6 months ago greyish white fluid comes out with pressure only. Denies any trauma, changes to herbs, diet or supplements. She states it is not from her nipple but from the bumps around the areola.\par Her sodium level dropped after DDavp is following with Dr. Brothers. Will have ddAVP prior to excision.\par She is s/p right 9/30/2020 (stoplight) MR bx path showed focal ductal hyperplasia and CCC and left breast 4:00 portion of IDP (stoplight) clip.\par \par She is s/p left breast 4:00 Ebbx on 12/10/2020 path showed benign breast parenchyma showing UDH, fibroadenomatoid changes, cysts, ccc and stromal fibrosis. She became COVID positive 12/10/2020 and is now ok. Her father is recovering from sepsis and covid.\par \par She completed Moderna right arm April 2021.\par \par She does SBE. \par She has noticed a change in her right  breast or a right  breast lump.\par She has not noticed a change in her nipple or nipple area.\par She has not noticed a change in the skin of the breast.\par She is experiencing greyish white right discharge from Bowen glands.\par She is experiencing right breast pain.\par She has noticed a lump or lymph node under the right armpit. \par \par BREAST CANCER RISK FACTORS\par Menarche: 14\par Date of LMP: 7/2/2021\par Menopause: pre \par Grav: 2     Para: 1 (11 yo son)\par Age at first live birth: 34\par Nursed: yes \par Hysterectomy: no\par Oophorectomy: no\par OCP: yes in the past for about 15 years. \par HRT: no\par Last pap/pelvic exam: 7/22/2020 WNL\par Related family history: paternal grandmother BCA age 40\par Ashkenazi: yes\par Mastery risk assessment:  BRCAPRO 18.3%, TCv7 24.8%, TCv8 23.4%, Ann Marie 12%, Yusuf 9.1%\par BRCA testing: no\par Bra size: 34A\par \par Last mammogram: 7/2/2021               Location[facility] \par Report reviewed.                               Images reviewed on PACS\par Results: BIRADS 1\par stable examination with no findings.\par \par Last ultrasound: 7/2/2021                                 Location: [facility] \par Report reviewed.                                 Images reviewed. \par Results: BIRADS 1\par stable examination with no evidence of malignancy\par \par Last MRI:  9/3/2020                                           Location: [facility]\par Report reviewed.\par Results: BIRADS 4\par right breast: Abnormal area of enhancement at 6:00-7:00 for which an MR guided biopsy is recommended. Additional focus of benign-appearing nodular enhancement at 3:00.\par left breast: focus of benign appearing nodular enhancement. MR biopsy rec. \par

## 2021-07-12 NOTE — PHYSICAL EXAM
[Normocephalic] : normocephalic [Atraumatic] : atraumatic [Supple] : supple [No Supraclavicular Adenopathy] : no supraclavicular adenopathy [Examined in the supine and seated position] : examined in the supine and seated position [No dominant masses] : no dominant masses in right breast  [No dominant masses] : no dominant masses left breast [No Nipple Retraction] : no left nipple retraction [No Nipple Discharge] : no left nipple discharge [No Axillary Lymphadenopathy] : no left axillary lymphadenopathy [No Edema] : no edema [No Rashes] : no rashes [No Ulceration] : no ulceration [de-identified] : healed periareolar incision, excellent cosmesis

## 2021-07-22 ENCOUNTER — RESULT REVIEW (OUTPATIENT)
Age: 46
End: 2021-07-22

## 2021-12-17 ENCOUNTER — NON-APPOINTMENT (OUTPATIENT)
Age: 46
End: 2021-12-17

## 2022-01-24 ENCOUNTER — APPOINTMENT (OUTPATIENT)
Dept: BREAST CENTER | Facility: CLINIC | Age: 47
End: 2022-01-24
Payer: COMMERCIAL

## 2022-01-24 VITALS
SYSTOLIC BLOOD PRESSURE: 109 MMHG | HEART RATE: 81 BPM | BODY MASS INDEX: 25.61 KG/M2 | DIASTOLIC BLOOD PRESSURE: 70 MMHG | WEIGHT: 150 LBS | HEIGHT: 64 IN

## 2022-01-24 DIAGNOSIS — N63.10 UNSPECIFIED LUMP IN THE RIGHT BREAST, UNSPECIFIED QUADRANT: ICD-10-CM

## 2022-01-24 PROCEDURE — 99214 OFFICE O/P EST MOD 30 MIN: CPT

## 2022-01-24 RX ORDER — AMITRIPTYLINE HYDROCHLORIDE 25 MG/1
TABLET ORAL
Refills: 0 | Status: DISCONTINUED | COMMUNITY
End: 2022-01-24

## 2022-01-24 NOTE — HISTORY OF PRESENT ILLNESS
[FreeTextEntry1] : This is a 46 years old being referred by Dr. Rodrigues for right breast pain. She had recent breast exam and was very tender on right. She noticed in her areola, 6 months ago greyish white fluid comes out with pressure only. Denies any trauma, changes to herbs, diet or supplements. She states it is not from her nipple but from the bumps around the areola.\par Her sodium level dropped after DDavp is following with Dr. Brothers. Will have ddAVP prior to excision.\par She is s/p right 9/30/2020 (stoplight) MR bx path showed focal ductal hyperplasia and CCC and left breast 4:00 portion of IDP (stoplight) clip.\par \par She is s/p left breast 4:00 Ebbx on 12/10/2020 path showed benign breast parenchyma showing UDH, fibroadenomatoid changes, cysts, ccc and stromal fibrosis. She became COVID positive 12/10/2020 and is now ok. Her father is recovering from sepsis and covid.\par \par She completed Moderna right arm April 2021.\par \par She does SBE. \par She has noticed a change in her right  breast or a right  breast lump.\par She has not noticed a change in her nipple or nipple area.\par She has not noticed a change in the skin of the breast.\par She is experiencing greyish white right discharge from Bowen glands.\par She is not experiencing right breast pain.\par She has noticed a lump or lymph node under the right armpit. \par \par BREAST CANCER RISK FACTORS\par Menarche: 14\par Date of LMP: 1/22/2021\par Menopause: pre \par Grav: 2     Para: 1 (13 yo son)\par Age at first live birth: 34\par Nursed: yes \par Hysterectomy: no\par Oophorectomy: no\par OCP: yes in the past for about 15 years. \par HRT: no\par Last pap/pelvic exam: 7/2021 WNL\par Related family history: paternal grandmother BCA age 40\par Ashkenazi: yes\par Mastery risk assessment:  BRCAPRO 18.3%, TCv7 24.8%, TCv8 23.4%, Ann Marie 12%, Yusuf 9.1%\par BRCA testing: no\par Bra size: 34A\par \par Last mammogram: 7/2/2021               Location[facility] \par Report reviewed.                               Images reviewed on PACS\par Results: BIRADS 1\par stable examination with no findings.\par \par Last ultrasound: 7/2/2021                                 Location: [facility] \par Report reviewed.                                 Images reviewed. \par Results: BIRADS 1\par stable examination with no evidence of malignancy\par \par Last MRI:  12/10/2021                                          Location: [facility]\par Report reviewed.\par Results: BIRADS 2\par Stable examination, no suspicious findings. \par

## 2022-01-24 NOTE — PHYSICAL EXAM
[Normocephalic] : normocephalic [Atraumatic] : atraumatic [Supple] : supple [No Supraclavicular Adenopathy] : no supraclavicular adenopathy [Examined in the supine and seated position] : examined in the supine and seated position [No dominant masses] : no dominant masses in right breast  [No dominant masses] : no dominant masses left breast [No Nipple Retraction] : no left nipple retraction [No Nipple Discharge] : no left nipple discharge [No Axillary Lymphadenopathy] : no left axillary lymphadenopathy [No Edema] : no edema [No Rashes] : no rashes [No Ulceration] : no ulceration [de-identified] : healed periareolar incision, excellent cosmesis

## 2022-01-24 NOTE — ASSESSMENT
[FreeTextEntry1] : 45 yo female with high risk\par re-run mastery at 50\par plan mg/sono JUL 2022\par plan MRI DEC 2022\par We reviewed risk reduction strategies including maintaining a BMI <25, limiting red meat intake and alcoholic beverages to 3 per week and exercise (150 min/ week low intensity or 75 min/week high intensity). And maintaining a normal vitamin D level.\par \par f/u 6 months with Dr. Rodrigues and me in 1 year\par She knows to call or return sooner should any concerns or questions arise.\par 
Clear

## 2022-03-02 ENCOUNTER — NON-APPOINTMENT (OUTPATIENT)
Age: 47
End: 2022-03-02

## 2022-03-24 ENCOUNTER — APPOINTMENT (OUTPATIENT)
Dept: HEMATOLOGY ONCOLOGY | Facility: CLINIC | Age: 47
End: 2022-03-24
Payer: COMMERCIAL

## 2022-03-24 VITALS
HEART RATE: 100 BPM | DIASTOLIC BLOOD PRESSURE: 82 MMHG | WEIGHT: 150 LBS | TEMPERATURE: 98.3 F | SYSTOLIC BLOOD PRESSURE: 121 MMHG | HEIGHT: 64 IN | OXYGEN SATURATION: 98 % | RESPIRATION RATE: 18 BRPM | BODY MASS INDEX: 25.61 KG/M2

## 2022-03-24 DIAGNOSIS — R19.7 DIARRHEA, UNSPECIFIED: ICD-10-CM

## 2022-03-24 DIAGNOSIS — R92.8 OTHER ABNORMAL AND INCONCLUSIVE FINDINGS ON DIAGNOSTIC IMAGING OF BREAST: ICD-10-CM

## 2022-03-24 DIAGNOSIS — D70.2 OTHER DRUG-INDUCED AGRANULOCYTOSIS: ICD-10-CM

## 2022-03-24 PROCEDURE — 99213 OFFICE O/P EST LOW 20 MIN: CPT

## 2022-03-29 PROBLEM — D70.2 OTHER DRUG-INDUCED NEUTROPENIA: Status: ACTIVE | Noted: 2020-09-21

## 2022-03-29 PROBLEM — R92.8 ABNORMAL FINDING ON BREAST IMAGING: Status: ACTIVE | Noted: 2020-07-22

## 2022-03-29 PROBLEM — R19.7 DIARRHEA: Status: ACTIVE | Noted: 2020-10-01

## 2022-03-29 NOTE — PHYSICAL EXAM
[Fully active, able to carry on all pre-disease performance without restriction] : Status 0 - Fully active, able to carry on all pre-disease performance without restriction [Normal] : bilateral breasts without nipple retraction, skin dimpling or palpable masses; the bilateral axillae are without adenopathy [de-identified] : well healed surgical incision to Left breast

## 2022-03-29 NOTE — ASSESSMENT
[FreeTextEntry1] : This is a 47 y/o premenopausal woman who presents for a hx of mild type 1 VW disease (also type 0 blood) , leukopenia, and possible MGUS in setting of many autoimmune diagnosis including hasimotos and also CIDP on IVIG \par \par 1) VW disease \par reviewed labs from 2012 and 2013, shows excelletn response to ddAVP\par she has tolerated ddAVP prior to 2 other procedures and tolerated well \par now with severe reaction and hyponatremia  to ddavp given prior to breast bx \par Patient tolerated the breast surgery well with low-dose DDAVP\par Her von Willebrand levels were within normal limits even prior to the DDAVP now on 2 occasions\par This brings into question the diagnosis of mild type I von Willebrand disease.\par Patient will repeat levels of von Willebrand, activity level, von Willebrand multimers and factor VIII level\par Patient has been off IVIG for some time now it is not clear whether might not this may have some relationship to her levels\par Furthermore the patient does have type O blood.\par re-Advised the patient that if she requires any further surgical procedures or biopsies that she keep us informed so that we can continue to monitor her levels however given her severe reaction to the DDAVP with hyponatremia if her levels are normal we may hold off on giving DDAVP and just monitor post procedure for any bleeding\par currently no procedures scheduled. \par \par 2) hyponatremia -\par Follow-up repeat levels, due to DDAVP\par repeat CMP today\par \par 3) breast abnormality on MRI \par biopsy results discussed with patient \par Patient had no evidence of atypia or cancer on excisional biopsy\par Ann Marie score was calculated today and is 14%\par The benefits do not seem to outweigh the risks in terms of chemoprevention at this point but will continue to monitor\par s/p recent Follow up with Dr. Restrepo \par next mammo/sono 07/2022; MRI breast due 12/2022\par \par 4) leukopenia \par Follow-up repeat CBC\par repeat CBC\par \par 5 ) polyclonal elevation in immunoglobulins\par high Ig levels due to IVIG \par No monoclonal spike on immunoelectrophoresis\par \par 6) iron deficiency \par likely r/t to menorrhagia. \par repeat ferritin today \par \par Discussed with patient at length \par follow-up in 1 year ( script for labs given to patient- she will complete prior to her appt). \par

## 2022-03-29 NOTE — HISTORY OF PRESENT ILLNESS
[de-identified] : \par feeling okay \par saw GI. had gastroparesis study. \par cannot drink lactose anymore \par taking oral iron every other day- still having heavy periods.  labs were checked right at the end of period. she is thinking this is why the ferritin was low. \par taking amytriptylline 10mg for neuropathy pain  and GI pain from gallbladder surgery. \par  [de-identified] : \par This is a 45 y/o woman with a hx of mild type 1 VW disease and now needs breast biopsy/ \par Patient noticed easy bruising when dx in lyme disease in 2012 , working in [place] , saw hematology there dr maradiaga. He ran VW panel and found the following : VW antigen was low at 51, RCofactor was 47(low) , VW activity 49(low) , multimers were normal and Factor 8 was low. \par She was also found to have type 0 blood so it was not clear if this was mild VW disease or possibly  from the blood type. \par In 2013, we have labs that show a very good response to ddAVP after about 30 mins with normalization of all levels. \par In the past she had had c sections, gallbladder and wisdom teeth. all ok no bleeding (did not receive any treatements) \par THen she had a sphintertromy 5 years ago and was given  ddAVP for preventing bleeding . She did not have any bleeding , Side effects included  face turns bright red , flusched hot feeling, no hx of sodium  dropping\par Had colonoscopy wit ddAVP  also. Tolerated well with no bleeding \par \par never nose bleeding but does bruise easier \par hx of heavy periods better now more regular \par She also has a history of hasinotos and follows with dr chung, \par Has a family hx of multiplecancers but did not do genetic testing yet \par She recently had a mammogram and ultrasound. THe ultrasound was read as focal ductal dilation at 7oclock.  recommend 6 month follow up but she had an MRI shich now showsareas of enhancement of the 6-7 oclock areas in right breast. \par She is scheduled to have biopsy via MRI on sept 30,2020. \par \par Patient also has a hx of leukopenia , this was thought to be due to elavil.  Never had bone marrow bx. 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

## 2022-07-25 ENCOUNTER — RESULT REVIEW (OUTPATIENT)
Age: 47
End: 2022-07-25

## 2022-07-28 ENCOUNTER — NON-APPOINTMENT (OUTPATIENT)
Age: 47
End: 2022-07-28

## 2022-11-29 ENCOUNTER — NON-APPOINTMENT (OUTPATIENT)
Age: 47
End: 2022-11-29

## 2022-12-27 ENCOUNTER — RESULT REVIEW (OUTPATIENT)
Age: 47
End: 2022-12-27

## 2022-12-27 ENCOUNTER — APPOINTMENT (OUTPATIENT)
Dept: HEMATOLOGY ONCOLOGY | Facility: CLINIC | Age: 47
End: 2022-12-27

## 2022-12-27 VITALS
WEIGHT: 150 LBS | SYSTOLIC BLOOD PRESSURE: 133 MMHG | HEART RATE: 98 BPM | HEIGHT: 64 IN | OXYGEN SATURATION: 94 % | TEMPERATURE: 98.4 F | BODY MASS INDEX: 25.61 KG/M2 | RESPIRATION RATE: 18 BRPM | DIASTOLIC BLOOD PRESSURE: 79 MMHG

## 2023-01-11 ENCOUNTER — NON-APPOINTMENT (OUTPATIENT)
Age: 48
End: 2023-01-11

## 2023-01-30 ENCOUNTER — APPOINTMENT (OUTPATIENT)
Dept: BREAST CENTER | Facility: CLINIC | Age: 48
End: 2023-01-30
Payer: COMMERCIAL

## 2023-01-30 VITALS
WEIGHT: 150 LBS | HEART RATE: 91 BPM | BODY MASS INDEX: 25.61 KG/M2 | DIASTOLIC BLOOD PRESSURE: 73 MMHG | SYSTOLIC BLOOD PRESSURE: 118 MMHG | HEIGHT: 64 IN

## 2023-01-30 DIAGNOSIS — D24.2 BENIGN NEOPLASM OF LEFT BREAST: ICD-10-CM

## 2023-01-30 DIAGNOSIS — Z86.2 PERSONAL HISTORY OF DISEASES OF THE BLOOD AND BLOOD-FORMING ORGANS AND CERTAIN DISORDERS INVOLVING THE IMMUNE MECHANISM: ICD-10-CM

## 2023-01-30 DIAGNOSIS — Z80.3 FAMILY HISTORY OF MALIGNANT NEOPLASM OF BREAST: ICD-10-CM

## 2023-01-30 PROCEDURE — 99215 OFFICE O/P EST HI 40 MIN: CPT

## 2023-01-30 RX ORDER — NORMAL SALT TABLETS 1 G/G
1 TABLET ORAL
Qty: 2 | Refills: 0 | Status: DISCONTINUED | COMMUNITY
Start: 2020-12-04 | End: 2023-01-30

## 2023-01-30 RX ORDER — AMITRIPTYLINE HYDROCHLORIDE 75 MG/1
TABLET, FILM COATED ORAL
Refills: 0 | Status: DISCONTINUED | COMMUNITY
End: 2023-01-30

## 2023-01-30 NOTE — CONSULT LETTER
[Dear  ___] : Dear  [unfilled], [Courtesy Letter:] : I had the pleasure of seeing your patient, [unfilled], in my office today. [Please see my note below.] : Please see my note below. [Consult Closing:] : Thank you very much for allowing me to participate in the care of this patient.  If you have any questions, please do not hesitate to contact me. [Sincerely,] : Sincerely, [DrTala  ___] : Dr. ROMANO [FreeTextEntry3] : Vicki Chong MS DO\par Breast Surgeon\par [facility] \[place] [address]\par

## 2023-01-30 NOTE — ASSESSMENT
[FreeTextEntry1] : 48 yo female with high risk\par re-run mastery at 50\par plan mg/sono JUL 2023\par plan MRI Jan 2024\par discussed management of IDP and since no atypia seen rec observation, will have pathology reviewed at [facility] as well\par \par We reviewed risk reduction strategies including maintaining a BMI <25, limiting red meat intake and alcoholic beverages to 3 per week and exercise (150 min/ week low intensity or 75 min/week high intensity). And maintaining a normal vitamin D level.\par \par f/u 6 months with Dr. Rodrigues and me in 1 year\par She knows to call or return sooner should any concerns or questions arise.\par

## 2023-01-30 NOTE — HISTORY OF PRESENT ILLNESS
[FreeTextEntry1] : This is a 47 years old being referred by Dr. Rodrigues for right breast pain. She had recent breast exam and was very tender on right. She noticed in her areola, 6 months ago greyish white fluid comes out with pressure only. Denies any trauma, changes to herbs, diet or supplements. She states it is not from her nipple but from the bumps around the areola.\par Her sodium level dropped after DDavp is following with Dr. Brothers. Will have ddAVP prior to excision.\par She is s/p right 9/30/2020 (stoplight) MR bx path showed focal ductal hyperplasia and CCC and left breast 4:00 portion of IDP (stoplight) clip.\par \par She is s/p left breast 4:00 Ebbx on 12/10/2020 path showed benign breast parenchyma showing UDH, fibroadenomatoid changes, cysts, ccc and stromal fibrosis. She became COVID positive 12/10/2020 and is now ok. Her father is recovering from sepsis and covid.\par \par She completed Moderna right arm April 2021.\par \par She is s/p left MRI bx 3:00 on 12/30/2022, pathology showed Nodular sclerosing lesion. 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 Dr. Perla felt it was completely removed.\par \par She does SBE. \par She has noticed a change in her right  breast or a right  breast lump.\par She has not noticed a change in her nipple or nipple area.\par She has not noticed a change in the skin of the breast.\par She is experiencing greyish white right discharge from Bowen glands.\par She is not experiencing right breast pain.\par She has noticed a lump or lymph node under the right armpit. \par \par BREAST CANCER RISK FACTORS\par Menarche: 14\par Date of LMP: 1/2023\par Menopause: pre \par Grav: 2     Para: 1 (11 yo son)\par Age at first live birth: 34\par Nursed: yes \par Hysterectomy: no\par Oophorectomy: no\par OCP: yes in the past for about 15 years. \par HRT: no\par Last pap/pelvic exam: 7/2022 WNL\par Related family history: paternal grandmother BCA age 40\par Ashkenazi: yes\par Mastery risk assessment:  BRCAPRO 18.3%, TCv7 24.8%, TCv8 23.4%, Ann Marie 12%, Yusuf 9.1%\par BRCA testing: no\par Bra size: 34A\par \par Last mammogram: 7/20/2022               Location: [facility] \par Report reviewed.                               Images reviewed on PACS\par Results: BIRADS 1\par Dense breasts, stable examination with no findings.\par \par Last ultrasound: 7/20/2022                                 Location: [facility] \par Report reviewed.                                 Images reviewed. \par Results: BIRADS 1\par stable examination with no evidence of malignancy\par \par Last MRI:  12/21/2022                                          Location: [facility]\par Report reviewed.\par Results: Right Breast BIRADS 2 Left Breast: BI-RADS 4\par 0.5 x 0.6cm irregular focus of enhancement in the left breast has slightly increases in size and in the amount of postcontrast enhancement since the prior exam. MRGbx is advised.

## 2023-01-30 NOTE — PHYSICAL EXAM
[Normocephalic] : normocephalic [Atraumatic] : atraumatic [Supple] : supple [No Supraclavicular Adenopathy] : no supraclavicular adenopathy [Examined in the supine and seated position] : examined in the supine and seated position [No dominant masses] : no dominant masses in right breast  [No dominant masses] : no dominant masses left breast [No Nipple Retraction] : no left nipple retraction [No Nipple Discharge] : no left nipple discharge [No Axillary Lymphadenopathy] : no left axillary lymphadenopathy [No Edema] : no edema [No Rashes] : no rashes [No Ulceration] : no ulceration [Symmetrical] : symmetrical [de-identified] : healed periareolar incision, excellent cosmesis

## 2023-02-05 ENCOUNTER — RESULT REVIEW (OUTPATIENT)
Age: 48
End: 2023-02-05

## 2023-03-07 ENCOUNTER — NON-APPOINTMENT (OUTPATIENT)
Age: 48
End: 2023-03-07

## 2023-03-22 DIAGNOSIS — E03.9 HYPOTHYROIDISM, UNSPECIFIED: ICD-10-CM

## 2023-03-22 DIAGNOSIS — E87.1 HYPO-OSMOLALITY AND HYPONATREMIA: ICD-10-CM

## 2023-03-22 DIAGNOSIS — R11.2 NAUSEA WITH VOMITING, UNSPECIFIED: ICD-10-CM

## 2023-03-22 DIAGNOSIS — E86.0 DEHYDRATION: ICD-10-CM

## 2023-03-23 ENCOUNTER — APPOINTMENT (OUTPATIENT)
Dept: HEMATOLOGY ONCOLOGY | Facility: CLINIC | Age: 48
End: 2023-03-23

## 2023-04-18 ENCOUNTER — APPOINTMENT (OUTPATIENT)
Dept: HEMATOLOGY ONCOLOGY | Facility: CLINIC | Age: 48
End: 2023-04-18
Payer: COMMERCIAL

## 2023-04-18 VITALS
HEART RATE: 90 BPM | DIASTOLIC BLOOD PRESSURE: 80 MMHG | OXYGEN SATURATION: 96 % | RESPIRATION RATE: 18 BRPM | TEMPERATURE: 97.8 F | WEIGHT: 160 LBS | BODY MASS INDEX: 27.31 KG/M2 | SYSTOLIC BLOOD PRESSURE: 128 MMHG | HEIGHT: 64 IN

## 2023-04-18 DIAGNOSIS — R30.0 DYSURIA: ICD-10-CM

## 2023-04-18 PROCEDURE — 36415 COLL VENOUS BLD VENIPUNCTURE: CPT

## 2023-04-18 PROCEDURE — 99213 OFFICE O/P EST LOW 20 MIN: CPT | Mod: 25

## 2023-04-18 NOTE — PHYSICAL EXAM
[Fully active, able to carry on all pre-disease performance without restriction] : Status 0 - Fully active, able to carry on all pre-disease performance without restriction [Normal] : affect appropriate [de-identified] : well healed surgical incision to Left breast

## 2023-04-18 NOTE — HISTORY OF PRESENT ILLNESS
[de-identified] : \par feeling okay \par saw GI. had gastroparesis study. \par cannot drink lactose anymore \par taking oral iron every other day- still having heavy periods.  labs were checked right at the end of period. she is thinking this is why the ferritin was low. \par taking amytriptylline 10mg for neuropathy pain  and GI pain from gallbladder surgery. \par  [de-identified] : his is a 45 y/o woman with a hx of mild type 1 VW disease and now needs breast biopsy/ \par Patient noticed easy bruising when dx in lyme disease in 2012 , working in [place] , saw hematology there dr maradiaga. He ran VW panel and found the following : VW antigen was low at 51, RCofactor was 47(low) , VW activity 49(low) , multimers were normal and Factor 8 was low. \par She was also found to have type 0 blood so it was not clear if this was mild VW disease or possibly from the blood type. \par In 2013, we have labs that show a very good response to ddAVP after about 30 mins with normalization of all levels. \par In the past she had had c sections, gallbladder and wisdom teeth. all ok no bleeding (did not receive any treatements) \par THen she had a sphintertromy 5 years ago and was given ddAVP for preventing bleeding. She did not have any bleeding , Side effects included face turns bright red , flusched hot feeling, no hx of sodium dropping\par Had colonoscopy wit ddAVP also. Tolerated well with no bleeding \par \par never nose bleeding but does bruise easier \par hx of heavy periods better now more regular \par She also has a history of hasinotos and follows with dr chung, \par Has a family hx of multiplecancers but did not do genetic testing yet \par She recently had a mammogram and ultrasound. THe ultrasound was read as focal ductal dilation at 7oclock. recommend 6 month follow up but she had an MRI shich now showsareas of enhancement of the 6-7 oclock areas in right breast. \par She is scheduled to have biopsy via MRI on sept 30,2020. \par \par Patient also has a hx of leukopenia , this was thought to be due to elavil. Never had bone marrow bx. 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 \par 12/18/20- surgery left breast negative for malginancy \par \par 12//20   , had covid recovered well and now has antibodies detected in her peripheral blood for Covid\par \par \par \par

## 2023-04-18 NOTE — ASSESSMENT
[FreeTextEntry1] : \par 48 y/o with CIDP, on IVIG every 5 weeks ( last dose 3 weeks back), hashimotos , chronic lyme, PCOS \par \par h/o ??Mild type I VWD diagnosed 2012 at an outside facility , per chart --patient had bruising while on long course of antibiotics for lyme disease at that time. Bruising has since resolved Unsure if VWD screen in 2012 was positive due to ? acute stress/illness. Also type O blood type.\par Repeat testing showed response to ddAVP\par Testing in 2020 done prior to ddAVP were normal\[facility]par No bruising now. Menstrual bleeding is regular now\par No other h/o abnormal bleeding\par No blood transfusions\par No family h/o bleeding diathesis\par  Not on any blood thinners\par Bleeding assessment tool score 3. \par h/o C- section in 2007/ and 2009 ithout amny prophylaxis or bleeding complications\par h/o cholecystectomy in 2010 without prophylaxis or bleeding complications\par h/o breast biopsy -- 2020 -- she received DDAVP and had severe hyponatremia.\par bile duct sphincterotomy 2015 -- received DDAVP for that\par \par 3/8/23 -- factor VIII 96%, vWF activity 86%, vWF ag --106%. Multimers nl\par  \par \par PT/PTT/ platelet count --nl on 12/27/22\par VWD antigen/ activity and factor VIII activity levels normal from 12/27/22. Multimer assay was nl in 10/2020\par \par \par 3) breast abnormality on MRIs. Heterogeneously dense breast\par f/u mammogram --7/23 and MRI 12/23 per Dr. Borges\par Most recent biopsy 2/6/23 --nodular sclerosing lesion. Few foci of UDH. cysts and papillary apocrine metaplasia\par Previosuly pathology showed - right breast negative  focal ductal hyperplasia usual hyperplasia \par left breast - sclerosing intraductal papilloma \par Genetic testing negative in 10/20/2020--19 gene panel\par \par \par 4)f/u neurology regarding CIDP -=-IVIG rteplacement\par \par 5 ) polyclonal elevation in immunoglobulins\par high Ig levels due to IVIG replacement\par No monoclonal spike on immunoelectrophoresis\par Repeat immunofixation\par \par 6) iron deficiency \par likely r/t to menorrhagia. \par repet iron -tibc/ferritin\par colonoscopy/EGD -12/21\par papsmear --7/23 due\par \par Discussed with patient at length \par follow-up in 1 year\par

## 2023-04-24 LAB
FACT VIII ACT/NOR PPP: 107 %
VWF AG PPP IA-ACNC: 149 %
VWF:RCO ACT/NOR PPP PL AGG: 125 %

## 2023-12-19 ENCOUNTER — NON-APPOINTMENT (OUTPATIENT)
Age: 48
End: 2023-12-19

## 2024-02-05 ENCOUNTER — APPOINTMENT (OUTPATIENT)
Dept: BREAST CENTER | Facility: CLINIC | Age: 49
End: 2024-02-05
Payer: COMMERCIAL

## 2024-02-05 VITALS
HEART RATE: 94 BPM | HEIGHT: 64 IN | DIASTOLIC BLOOD PRESSURE: 82 MMHG | BODY MASS INDEX: 26.98 KG/M2 | WEIGHT: 158 LBS | SYSTOLIC BLOOD PRESSURE: 120 MMHG

## 2024-02-05 DIAGNOSIS — R92.30 DENSE BREASTS, UNSPECIFIED: ICD-10-CM

## 2024-02-05 DIAGNOSIS — Z12.31 ENCOUNTER FOR SCREENING MAMMOGRAM FOR MALIGNANT NEOPLASM OF BREAST: ICD-10-CM

## 2024-02-05 PROCEDURE — 99214 OFFICE O/P EST MOD 30 MIN: CPT

## 2024-02-05 NOTE — CONSULT LETTER
[Dear  ___] : Dear  [unfilled], [Courtesy Letter:] : I had the pleasure of seeing your patient, [unfilled], in my office today. [Please see my note below.] : Please see my note below. [Consult Closing:] : Thank you very much for allowing me to participate in the care of this patient.  If you have any questions, please do not hesitate to contact me. [Sincerely,] : Sincerely, [DrTala  ___] : Dr. ROMANO [FreeTextEntry3] : Vicki Chong MS DO\par  Breast Surgeon\par  [facility] \[place] [address]\par

## 2024-02-05 NOTE — HISTORY OF PRESENT ILLNESS
[FreeTextEntry1] : This is a 48 years old being referred by Dr. Rodrigues for right breast pain. She had recent breast exam and was very tender on right. She noticed in her areola, 6 months ago greyish white fluid comes out with pressure only. Denies any trauma, changes to herbs, diet or supplements. She states it is not from her nipple but from the bumps around the areola. Her sodium level dropped after DDavp is following with Dr. Brothers. Will have ddAVP prior to excision. She is s/p right 9/30/2020 (stoplight) MR bx path showed focal ductal hyperplasia and CCC and left breast 4:00 portion of IDP (stoplight) clip.  She is s/p left breast 4:00 Ebbx on 12/10/2020 path showed benign breast parenchyma showing UDH, fibroadenomatoid changes, cysts, ccc and stromal fibrosis. She became COVID positive 12/10/2020 and is now ok. Her father is recovering from sepsis and covid.  She completed Moderna right arm April 2021.  She is s/p left MRI bx 3:00 on 12/30/2022, pathology showed Nodular sclerosing lesion. 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 Dr. Perla felt it was completely removed.  She does SBE.  She has noticed a change in her right  breast or a right  breast lump. She has not noticed a change in her nipple or nipple area. She has not noticed a change in the skin of the breast. She is experiencing greyish white right discharge from Bowen glands. She is not experiencing right breast pain. She has noticed a lump or lymph node under the right armpit.   BREAST CANCER RISK FACTORS Menarche: 14 Date of LMP: 1/2023 Menopause: pre  Grav: 2     Para: 1 (13 yo son) Age at first live birth: 34 Nursed: yes  Hysterectomy: no Oophorectomy: no OCP: yes in the past for about 15 years.  HRT: no Last pap/pelvic exam: 7/2022 WNL Related family history: paternal grandmother BCA age 40 Ashkenazi: yes Mastery risk assessment:  BRCAPRO 18.3%, TCv7 24.8%, TCv8 23.4%, Ann Marie 12%, Yusuf 9.1% BRCA testing: no Bra size: 34A  Last mammogram: 08/10/2023             Location[facility]  Report reviewed.                               Images reviewed on PACS Results: BIRADS 1 Heterogeneously Dense breasts, stable examination with no findings.  Last ultrasound: 08/10/2023                                 Location: [facility]  Report reviewed.                                 Images reviewed.  Results: BIRADS 1 stable examination with no evidence of malignancy  Last MRI:  12/28/23                                          Location: [facility] Report reviewed. Results: Breast BIRADS 2 Stable examintion, no suspicious findings.

## 2024-02-05 NOTE — PHYSICAL EXAM
[Normocephalic] : normocephalic [Atraumatic] : atraumatic [Supple] : supple [No Supraclavicular Adenopathy] : no supraclavicular adenopathy [Examined in the supine and seated position] : examined in the supine and seated position [Symmetrical] : symmetrical [No dominant masses] : no dominant masses in right breast  [No dominant masses] : no dominant masses left breast [No Nipple Retraction] : no left nipple retraction [No Nipple Discharge] : no left nipple discharge [No Axillary Lymphadenopathy] : no left axillary lymphadenopathy [No Edema] : no edema [No Rashes] : no rashes [No Ulceration] : no ulceration [de-identified] : healed periareolar incision, excellent cosmesis

## 2024-02-05 NOTE — ASSESSMENT
[FreeTextEntry1] : 47 yo female with high risk re-run mastery at 50 plan mg/sono Aug 2024 plan MRI FEB 2025 We reviewed risk reduction strategies including maintaining a BMI <25, limiting red meat intake and alcoholic beverages to 3 per week and exercise (150 min/ week low intensity or 75 min/week high intensity). And maintaining a normal vitamin D level.  f/u 6 months with Dr. Rodrigues and me in 1 year She knows to call or return sooner should any concerns or questions arise.

## 2024-04-23 ENCOUNTER — APPOINTMENT (OUTPATIENT)
Dept: HEMATOLOGY ONCOLOGY | Facility: CLINIC | Age: 49
End: 2024-04-23

## 2024-05-02 ENCOUNTER — RESULT REVIEW (OUTPATIENT)
Age: 49
End: 2024-05-02

## 2024-05-02 ENCOUNTER — APPOINTMENT (OUTPATIENT)
Dept: HEMATOLOGY ONCOLOGY | Facility: CLINIC | Age: 49
End: 2024-05-02
Payer: COMMERCIAL

## 2024-05-02 VITALS
SYSTOLIC BLOOD PRESSURE: 118 MMHG | OXYGEN SATURATION: 100 % | TEMPERATURE: 97.6 F | HEIGHT: 64 IN | BODY MASS INDEX: 28.17 KG/M2 | RESPIRATION RATE: 18 BRPM | WEIGHT: 165 LBS | HEART RATE: 91 BPM | DIASTOLIC BLOOD PRESSURE: 77 MMHG

## 2024-05-02 DIAGNOSIS — R76.8 OTHER SPECIFIED ABNORMAL IMMUNOLOGICAL FINDINGS IN SERUM: ICD-10-CM

## 2024-05-02 DIAGNOSIS — D89.0 POLYCLONAL HYPERGAMMAGLOBULINEMIA: ICD-10-CM

## 2024-05-02 DIAGNOSIS — D47.2 MONOCLONAL GAMMOPATHY: ICD-10-CM

## 2024-05-02 DIAGNOSIS — D64.9 ANEMIA, UNSPECIFIED: ICD-10-CM

## 2024-05-02 PROCEDURE — 99214 OFFICE O/P EST MOD 30 MIN: CPT

## 2024-05-02 RX ORDER — IMMUNE GLOBULIN INTRAVENOUS (HUMAN) 5 G
5 KIT INTRAVENOUS
Refills: 0 | Status: ACTIVE | COMMUNITY

## 2024-05-03 ENCOUNTER — TRANSCRIPTION ENCOUNTER (OUTPATIENT)
Age: 49
End: 2024-05-03

## 2024-05-03 PROBLEM — D47.2 MGUS (MONOCLONAL GAMMOPATHY OF UNKNOWN SIGNIFICANCE): Noted: 2020-09-21

## 2024-05-03 PROBLEM — R76.8 HIGH TOTAL SERUM IGM: Status: ACTIVE | Noted: 2023-04-18

## 2024-05-03 PROBLEM — D89.0 POLYCLONAL GAMMOPATHY DETERMINED BY SERUM PROTEIN ELECTROPHORESIS: Status: ACTIVE | Noted: 2024-05-03

## 2024-05-13 PROBLEM — D64.9 ANEMIA: Status: ACTIVE | Noted: 2020-12-09

## 2024-05-13 NOTE — REVIEW OF SYSTEMS
[Negative] : Allergic/Immunologic [Fever] : no fever [Fatigue] : no fatigue [Recent Change In Weight] : ~T no recent weight change

## 2024-05-13 NOTE — RESULTS/DATA
[FreeTextEntry1] : 3/8/23 -- factor VIII 96%, vWF activity 86%, vWF ag --106%. Multimers nl    PT/PTT/ platelet count --nl on 12/27/22 VWD antigen/ activity and factor VIII activity levels normal from 12/27/22. Multimer assay was nl in 10/2020

## 2024-05-13 NOTE — PHYSICAL EXAM
[Fully active, able to carry on all pre-disease performance without restriction] : Status 0 - Fully active, able to carry on all pre-disease performance without restriction [Normal] : affect appropriate [de-identified] : well healed surgical incision to Left breast

## 2024-05-13 NOTE — ASSESSMENT
[FreeTextEntry1] : 46 y/o with CIDP, on IVIG every 5 weeks ( last dose 3 weeks back), hashimotos , chronic lyme, PCOS   h/o ??Mild type I VWD diagnosed  at an outside facility , per chart --patient had bruising while on long course of antibiotics for lyme disease at that time. Bruising has since resolved Unsure if VWD screen in  was positive due to ? acute stress/illness. Also type O blood type. Repeat testing showed response to ddAVP Testing in  done prior to ddAVP were normal h/o iron deficiency anemia and heavy periods since menarche. Bleeding assessment tool score 3.  h/o C- section in / and  without any prophylaxis or bleeding complications h/o cholecystectomy in  without prophylaxis or bleeding complications h/o breast biopsy --  -- she received DDAVP and had severe hyponatremia. bile duct sphincterotomy  -- received DDAVP for that   #Type 1 von Willebrand -receives prophylactic DDAVP for procedures -had hyponatremia in the past after DDAVPD had surgeries before type 1 diagnosis without DDAVP or bleeding with  and cholecystectomy   #) breast abnormality on MRIs. Heterogeneously dense breast -explained to patient that I am not a breast oncologist. f/u with Dr. Borges   #f/u neurology regarding CIDP -=-IVIG replacement  # polyclonal elevation in immunoglobulins high Ig levels due to IVIG replacement No monoclonal spike on immunoelectrophoresis Repeat immunofixation  #iron deficiency  likely r/t to menorrhagia.  repeat iron -tibc/ferritin colonoscopy/EGD -   Discussed with patient at length  follow-up in 1 year

## 2024-05-13 NOTE — HISTORY OF PRESENT ILLNESS
[de-identified] : 47 y/o woman with a hx of mild type 1 VW disease and now needs breast biopsy/  Patient noticed easy bruising when dx in lyme disease in 2012 , working in [place] , saw hematology there dr maradiaga. He ran VW panel and found the following : VW antigen was low at 51, RCofactor was 47(low) , VW activity 49(low) , multimers were normal and Factor 8 was low.  She was also found to have type 0 blood so it was not clear if this was mild VW disease or possibly from the blood type.  In 2013, we have labs that show a very good response to ddAVP after about 30 mins with normalization of all levels.  In the past she had had c sections, gallbladder and wisdom teeth. all ok no bleeding (did not receive any treatements)  THen she had a sphintertromy 5 years ago and was given ddAVP for preventing bleeding. She did not have any bleeding , Side effects included face turns bright red , flusched hot feeling, no hx of sodium dropping Had colonoscopy wit ddAVP also. Tolerated well with no bleeding   never nose bleeding but does bruise easier  hx of heavy periods better now more regular  She also has a history of hasinotos and follows with dr chung,  Has a family hx of multiplecancers but did not do genetic testing yet  She recently had a mammogram and ultrasound. THe ultrasound was read as focal ductal dilation at 7oclock. recommend 6 month follow up but she had an MRI shich now showsareas of enhancement of the 6-7 oclock areas in right breast.  She is scheduled to have biopsy via MRI on sept 30,2020.   Patient also has a hx of leukopenia , this was thought to be due to elavil. Never had bone marrow bx.  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  12/18/20- surgery left breast negative for malginancy   12//20   , had covid recovered well and now has antibodies detected in her peripheral blood for Covid     [de-identified] : feeling okay  saw GI. had gastroparesis study.  cannot drink lactose anymore  taking oral iron every other day- still having heavy periods.  labs were checked right at the end of period. she is thinking this is why the ferritin was low.  taking amytriptylline 10mg for neuropathy pain  and GI pain from gallbladder surgery.    5/2/2024 Iron deficiency MGUS Feels fatigue Had bloodwork on

## 2024-05-28 ENCOUNTER — TRANSCRIPTION ENCOUNTER (OUTPATIENT)
Age: 49
End: 2024-05-28

## 2024-05-30 ENCOUNTER — TRANSCRIPTION ENCOUNTER (OUTPATIENT)
Age: 49
End: 2024-05-30

## 2024-07-11 DIAGNOSIS — R59.9 ENLARGED LYMPH NODES, UNSPECIFIED: ICD-10-CM

## 2024-08-12 ENCOUNTER — NON-APPOINTMENT (OUTPATIENT)
Age: 49
End: 2024-08-12

## 2024-08-30 ENCOUNTER — NON-APPOINTMENT (OUTPATIENT)
Age: 49
End: 2024-08-30

## 2024-09-10 ENCOUNTER — APPOINTMENT (OUTPATIENT)
Dept: HEMATOLOGY ONCOLOGY | Facility: CLINIC | Age: 49
End: 2024-09-10
Payer: COMMERCIAL

## 2024-09-10 VITALS
BODY MASS INDEX: 27.98 KG/M2 | WEIGHT: 163.9 LBS | HEART RATE: 96 BPM | HEIGHT: 64 IN | SYSTOLIC BLOOD PRESSURE: 120 MMHG | DIASTOLIC BLOOD PRESSURE: 77 MMHG | RESPIRATION RATE: 18 BRPM | OXYGEN SATURATION: 96 % | TEMPERATURE: 97.9 F

## 2024-09-10 DIAGNOSIS — E61.1 IRON DEFICIENCY: ICD-10-CM

## 2024-09-10 DIAGNOSIS — R59.1 GENERALIZED ENLARGED LYMPH NODES: ICD-10-CM

## 2024-09-10 PROCEDURE — 99215 OFFICE O/P EST HI 40 MIN: CPT

## 2024-09-10 NOTE — ASSESSMENT
[FreeTextEntry1] : CIDP, on IVIG every 6 weeks , hashimotos , chronic lyme, PCOS  #axillary LAD s/p US breast 2024 BIRADS 3 in the R axilla at the site of clinical concern there is a development of a LN measuring 2.2x2.9x1.1cm bx rec  s/p axilla bx 2024 revealing cores of fibroadipose tissue and portions of lymphoid tissue demonstrating lypmphoid hyperplasia no evidence of granulomata or carcinoma.  Molecular studies revealing no evidence of monoclonal IGH or IGK rearrangement.  discussed that this is likely due to autoimmune conditions will monitor. If new adenopathy is palpated will consider PET CT. no B symptoms will check HIV, EBV, Flow cytometry  # Iron deficiency she wants to try to consistently take oral Iron  will recheck in 4 months and if remains low would recommend venofer 200 mg IV x 5 doses CNY  - does not need for 5 years sees GYN   h/o ??Mild type I VWD diagnosed  at an outside facility , per chart --patient had bruising while on long course of antibiotics for lyme disease at that time. Bruising has since resolved Unsure if VWD screen in  was positive due to ? acute stress/illness. Also type O blood type. Repeat testing showed response to ddAVP Testing in  done prior to ddAVP were normal h/o iron deficiency anemia and heavy periods since menarche. Bleeding assessment tool score 3. h/o C- section in / and  without any prophylaxis or bleeding complications h/o cholecystectomy in  without prophylaxis or bleeding complications h/o breast biopsy --  -- she received DDAVP and had severe hyponatremia. bile duct sphincterotomy  -- received DDAVP for that   #?Type 1 von Willebrand -was receiving prophylactic DDAVP for procedures but had hyponatremia and stopped  -had surgeries before type 1 diagnosis without DDAVP or bleeding with  and cholecystectomy.   #) breast abnormality on MRIs. Heterogeneously dense breast f/u with Dr. Chong   #f/u neurology regarding CIDP -=-IVIG replacement  # polyclonal elevation in immunoglobulins high Ig levels due to IVIG replacement No monoclonal spike on immunoelectrophoresis   follow-up in . She will receive labs in  - cbc with diff, cmp, iron, ferriitn, ESR/CRP, immunoglobulins, b12, folate

## 2024-09-10 NOTE — HISTORY OF PRESENT ILLNESS
[de-identified] : Ms. Casiano is a 48 years old female referred for abnormal breast bx.   Oncological History:   Was referred by Dr. Rodrigues for right breast pain to Dr. Chong.   She noticed in her areola greyish white fluid comes out with pressure only.   Her sodium level dropped after DDavp was following with Dr. Brothers for VW disease and then transferred to Dr. Santiago. Stopped getting DDAVP prior to procedures and no issues   s/p right 9/30/2020 MR bx path showed focal ductal hyperplasia and CCC and left breast 4:00 portion of IDP  s/p left breast 4:00 Ebbx on 12/10/2020 path showed benign breast parenchyma showing UDH, fibroadenomatoid changes, cysts, ccc and stromal fibrosis.   s/p left MRI bx 3:00 on 12/30/2022, pathology showed Nodular sclerosing lesion. 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 Dr. Perla felt it was completely removed.  s/p MRI breast 12/2023 BIRADS 2   s/p US breast 5/2024 BIRADS 3 in the R axilla at the site of clinical concern there is a development of a LN measuring 2.2x2.9x1.1cm bx rec   s/p axilla bx 7/2024 revealing cores of fibroadipose tissue and portions of lymphoid tissue demonstrating lypmphoid hyperplasia no evidence of granulomata or carcinoma.   Molecular studies revealing no evidence of monoclonal IGH or IGK rearrangement.   s/p mammo/sono 8/2024 revealing stable exam with no evidence of malignancy   BREAST CANCER RISK FACTORS Menarche: 15 Date of LMP: 9/3/24 Menopause: pre Grav: 2 Para: 1 (13 yo son) Age at first live birth: 34 Nursed: yes Hysterectomy: no Oophorectomy: no OCP: yes in the past for about 12 years. HRT: no Last pap/pelvic exam: due now  Related family history: paternal grandmother BCA age 40 Ashkenazi: yes Genetics negative  BRCA testing: no

## 2024-09-10 NOTE — HISTORY OF PRESENT ILLNESS
[de-identified] : Ms. Casiano is a 48 years old female referred for abnormal breast bx.   Oncological History:   Was referred by Dr. Rodrigues for right breast pain to Dr. Chong.   She noticed in her areola greyish white fluid comes out with pressure only.   Her sodium level dropped after DDavp was following with Dr. Brothers for VW disease and then transferred to Dr. Santiago. Stopped getting DDAVP prior to procedures and no issues   s/p right 9/30/2020 MR bx path showed focal ductal hyperplasia and CCC and left breast 4:00 portion of IDP  s/p left breast 4:00 Ebbx on 12/10/2020 path showed benign breast parenchyma showing UDH, fibroadenomatoid changes, cysts, ccc and stromal fibrosis.   s/p left MRI bx 3:00 on 12/30/2022, pathology showed Nodular sclerosing lesion. 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 Dr. Perla felt it was completely removed.  s/p MRI breast 12/2023 BIRADS 2   s/p US breast 5/2024 BIRADS 3 in the R axilla at the site of clinical concern there is a development of a LN measuring 2.2x2.9x1.1cm bx rec   s/p axilla bx 7/2024 revealing cores of fibroadipose tissue and portions of lymphoid tissue demonstrating lypmphoid hyperplasia no evidence of granulomata or carcinoma.   Molecular studies revealing no evidence of monoclonal IGH or IGK rearrangement.   s/p mammo/sono 8/2024 revealing stable exam with no evidence of malignancy   BREAST CANCER RISK FACTORS Menarche: 15 Date of LMP: 9/3/24 Menopause: pre Grav: 2 Para: 1 (11 yo son) Age at first live birth: 34 Nursed: yes Hysterectomy: no Oophorectomy: no OCP: yes in the past for about 12 years. HRT: no Last pap/pelvic exam: due now  Related family history: paternal grandmother BCA age 40 Ashkenazi: yes Genetics negative  BRCA testing: no

## 2024-10-03 ENCOUNTER — NON-APPOINTMENT (OUTPATIENT)
Age: 49
End: 2024-10-03

## 2025-01-30 ENCOUNTER — NON-APPOINTMENT (OUTPATIENT)
Age: 50
End: 2025-01-30

## 2025-02-10 ENCOUNTER — NON-APPOINTMENT (OUTPATIENT)
Age: 50
End: 2025-02-10

## 2025-03-17 ENCOUNTER — APPOINTMENT (OUTPATIENT)
Dept: BREAST CENTER | Facility: CLINIC | Age: 50
End: 2025-03-17
Payer: COMMERCIAL

## 2025-03-17 VITALS
SYSTOLIC BLOOD PRESSURE: 115 MMHG | DIASTOLIC BLOOD PRESSURE: 75 MMHG | HEIGHT: 64.6 IN | HEART RATE: 90 BPM | WEIGHT: 165 LBS | BODY MASS INDEX: 27.83 KG/M2

## 2025-03-17 DIAGNOSIS — Z80.8 FAMILY HISTORY OF MALIGNANT NEOPLASM OF OTHER ORGANS OR SYSTEMS: ICD-10-CM

## 2025-03-17 DIAGNOSIS — R92.30 DENSE BREASTS, UNSPECIFIED: ICD-10-CM

## 2025-03-17 DIAGNOSIS — Z12.31 ENCOUNTER FOR SCREENING MAMMOGRAM FOR MALIGNANT NEOPLASM OF BREAST: ICD-10-CM

## 2025-03-17 DIAGNOSIS — Z78.9 OTHER SPECIFIED HEALTH STATUS: ICD-10-CM

## 2025-03-17 DIAGNOSIS — Z91.89 OTHER SPECIFIED PERSONAL RISK FACTORS, NOT ELSEWHERE CLASSIFIED: ICD-10-CM

## 2025-03-17 DIAGNOSIS — D24.2 BENIGN NEOPLASM OF LEFT BREAST: ICD-10-CM

## 2025-03-17 DIAGNOSIS — Z80.3 FAMILY HISTORY OF MALIGNANT NEOPLASM OF BREAST: ICD-10-CM

## 2025-03-17 DIAGNOSIS — R92.8 OTHER ABNORMAL AND INCONCLUSIVE FINDINGS ON DIAGNOSTIC IMAGING OF BREAST: ICD-10-CM

## 2025-03-17 PROCEDURE — 99215 OFFICE O/P EST HI 40 MIN: CPT

## 2025-03-17 RX ORDER — MULTIVIT WITH MIN/ALA/HERBS 50 MG
TABLET ORAL
Refills: 0 | Status: ACTIVE | COMMUNITY

## 2025-05-27 ENCOUNTER — APPOINTMENT (OUTPATIENT)
Dept: HEMATOLOGY ONCOLOGY | Facility: CLINIC | Age: 50
End: 2025-05-27
Payer: COMMERCIAL

## 2025-05-27 VITALS
HEIGHT: 64.6 IN | RESPIRATION RATE: 16 BRPM | BODY MASS INDEX: 27.83 KG/M2 | TEMPERATURE: 97.5 F | SYSTOLIC BLOOD PRESSURE: 117 MMHG | DIASTOLIC BLOOD PRESSURE: 77 MMHG | HEART RATE: 80 BPM | OXYGEN SATURATION: 96 % | WEIGHT: 165 LBS

## 2025-05-27 DIAGNOSIS — Z91.89 OTHER SPECIFIED PERSONAL RISK FACTORS, NOT ELSEWHERE CLASSIFIED: ICD-10-CM

## 2025-05-27 DIAGNOSIS — E61.1 IRON DEFICIENCY: ICD-10-CM

## 2025-05-27 DIAGNOSIS — D89.0 POLYCLONAL HYPERGAMMAGLOBULINEMIA: ICD-10-CM

## 2025-05-27 DIAGNOSIS — R59.1 GENERALIZED ENLARGED LYMPH NODES: ICD-10-CM

## 2025-05-27 DIAGNOSIS — E03.9 HYPOTHYROIDISM, UNSPECIFIED: ICD-10-CM

## 2025-05-27 PROCEDURE — 99214 OFFICE O/P EST MOD 30 MIN: CPT

## 2025-05-27 PROCEDURE — G2211 COMPLEX E/M VISIT ADD ON: CPT | Mod: NC
